# Patient Record
Sex: MALE | URBAN - METROPOLITAN AREA
[De-identification: names, ages, dates, MRNs, and addresses within clinical notes are randomized per-mention and may not be internally consistent; named-entity substitution may affect disease eponyms.]

---

## 2023-03-06 ENCOUNTER — INPATIENT (INPATIENT)
Facility: HOSPITAL | Age: 53
LOS: 7 days | Discharge: HOME | DRG: 871 | End: 2023-03-14
Attending: STUDENT IN AN ORGANIZED HEALTH CARE EDUCATION/TRAINING PROGRAM | Admitting: INTERNAL MEDICINE
Payer: SELF-PAY

## 2023-03-06 ENCOUNTER — FORM ENCOUNTER (OUTPATIENT)
Age: 53
End: 2023-03-06

## 2023-03-06 VITALS
SYSTOLIC BLOOD PRESSURE: 100 MMHG | HEART RATE: 92 BPM | OXYGEN SATURATION: 94 % | RESPIRATION RATE: 18 BRPM | DIASTOLIC BLOOD PRESSURE: 66 MMHG

## 2023-03-06 LAB
ALBUMIN SERPL ELPH-MCNC: 2.6 G/DL — LOW (ref 3.4–5)
ALBUMIN SERPL ELPH-MCNC: 2.7 G/DL — LOW (ref 3.4–5)
ALP SERPL-CCNC: 47 U/L — SIGNIFICANT CHANGE UP (ref 40–120)
ALP SERPL-CCNC: 53 U/L — SIGNIFICANT CHANGE UP (ref 40–120)
ALT FLD-CCNC: 34 U/L — SIGNIFICANT CHANGE UP (ref 12–42)
ALT FLD-CCNC: 48 U/L — HIGH (ref 12–42)
AMPHET UR-MCNC: NEGATIVE — SIGNIFICANT CHANGE UP
ANION GAP SERPL CALC-SCNC: 23 MMOL/L — HIGH (ref 9–16)
ANION GAP SERPL CALC-SCNC: 26 MMOL/L — HIGH (ref 9–16)
ANION GAP SERPL CALC-SCNC: 28 MMOL/L — HIGH (ref 9–16)
ANION GAP SERPL CALC-SCNC: 31 MMOL/L — HIGH (ref 9–16)
ANION GAP SERPL CALC-SCNC: >30 MMOL/L — HIGH (ref 9–16)
APAP SERPL-MCNC: <2 UG/ML — LOW (ref 10–30)
APPEARANCE UR: CLEAR — SIGNIFICANT CHANGE UP
APTT BLD: 26.3 SEC — LOW (ref 27.5–35.5)
AST SERPL-CCNC: 26 U/L — SIGNIFICANT CHANGE UP (ref 15–37)
AST SERPL-CCNC: 44 U/L — HIGH (ref 15–37)
BACTERIA # UR AUTO: PRESENT /HPF
BARBITURATES UR SCN-MCNC: NEGATIVE — SIGNIFICANT CHANGE UP
BASOPHILS # BLD AUTO: 0 K/UL — SIGNIFICANT CHANGE UP (ref 0–0.2)
BASOPHILS NFR BLD AUTO: 0 % — SIGNIFICANT CHANGE UP (ref 0–2)
BENZODIAZ UR-MCNC: NEGATIVE — SIGNIFICANT CHANGE UP
BILIRUB SERPL-MCNC: 0.2 MG/DL — SIGNIFICANT CHANGE UP (ref 0.2–1.2)
BILIRUB SERPL-MCNC: 0.4 MG/DL — SIGNIFICANT CHANGE UP (ref 0.2–1.2)
BILIRUB UR-MCNC: NEGATIVE — SIGNIFICANT CHANGE UP
BUN SERPL-MCNC: 37 MG/DL — HIGH (ref 7–23)
BUN SERPL-MCNC: 39 MG/DL — HIGH (ref 7–23)
BUN SERPL-MCNC: 40 MG/DL — HIGH (ref 7–23)
CALCIUM SERPL-MCNC: 7.1 MG/DL — LOW (ref 8.5–10.5)
CALCIUM SERPL-MCNC: 7.3 MG/DL — LOW (ref 8.5–10.5)
CALCIUM SERPL-MCNC: 8.5 MG/DL — SIGNIFICANT CHANGE UP (ref 8.5–10.5)
CALCIUM SERPL-MCNC: 8.5 MG/DL — SIGNIFICANT CHANGE UP (ref 8.5–10.5)
CALCIUM SERPL-MCNC: 8.6 MG/DL — SIGNIFICANT CHANGE UP (ref 8.5–10.5)
CHLORIDE SERPL-SCNC: 101 MMOL/L — SIGNIFICANT CHANGE UP (ref 96–108)
CHLORIDE SERPL-SCNC: 101 MMOL/L — SIGNIFICANT CHANGE UP (ref 96–108)
CHLORIDE SERPL-SCNC: 103 MMOL/L — SIGNIFICANT CHANGE UP (ref 96–108)
CHLORIDE SERPL-SCNC: 97 MMOL/L — SIGNIFICANT CHANGE UP (ref 96–108)
CHLORIDE SERPL-SCNC: 99 MMOL/L — SIGNIFICANT CHANGE UP (ref 96–108)
CK SERPL-CCNC: 464 U/L — HIGH (ref 39–308)
CO2 SERPL-SCNC: 11 MMOL/L — LOW (ref 22–31)
CO2 SERPL-SCNC: 15 MMOL/L — LOW (ref 22–31)
CO2 SERPL-SCNC: 6 MMOL/L — CRITICAL LOW (ref 22–31)
CO2 SERPL-SCNC: 8 MMOL/L — CRITICAL LOW (ref 22–31)
CO2 SERPL-SCNC: <6 MMOL/L — CRITICAL LOW (ref 22–31)
COCAINE METAB.OTHER UR-MCNC: NEGATIVE — SIGNIFICANT CHANGE UP
COLOR SPEC: YELLOW — SIGNIFICANT CHANGE UP
COMMENT - URINE: SIGNIFICANT CHANGE UP
CREAT SERPL-MCNC: 1.73 MG/DL — HIGH (ref 0.5–1.3)
CREAT SERPL-MCNC: 1.77 MG/DL — HIGH (ref 0.5–1.3)
CREAT SERPL-MCNC: 2 MG/DL — HIGH (ref 0.5–1.3)
CREAT SERPL-MCNC: 2.03 MG/DL — HIGH (ref 0.5–1.3)
CREAT SERPL-MCNC: 2.07 MG/DL — HIGH (ref 0.5–1.3)
D DIMER BLD IA.RAPID-MCNC: 224 NG/ML DDU — SIGNIFICANT CHANGE UP
DIFF PNL FLD: ABNORMAL
EGFR: 38 ML/MIN/1.73M2 — LOW
EGFR: 38 ML/MIN/1.73M2 — LOW
EGFR: 39 ML/MIN/1.73M2 — LOW
EGFR: 45 ML/MIN/1.73M2 — LOW
EGFR: 47 ML/MIN/1.73M2 — LOW
EOSINOPHIL # BLD AUTO: 0.06 K/UL — SIGNIFICANT CHANGE UP (ref 0–0.5)
EOSINOPHIL NFR BLD AUTO: 0.5 % — SIGNIFICANT CHANGE UP (ref 0–6)
EPI CELLS # UR: SIGNIFICANT CHANGE UP /HPF (ref 0–5)
ETHANOL SERPL-MCNC: <3 MG/DL — SIGNIFICANT CHANGE UP
FLUAV AG NPH QL: SIGNIFICANT CHANGE UP
FLUBV AG NPH QL: SIGNIFICANT CHANGE UP
GLUCOSE SERPL-MCNC: 179 MG/DL — HIGH (ref 70–99)
GLUCOSE SERPL-MCNC: 193 MG/DL — HIGH (ref 70–99)
GLUCOSE SERPL-MCNC: 207 MG/DL — HIGH (ref 70–99)
GLUCOSE SERPL-MCNC: 234 MG/DL — HIGH (ref 70–99)
GLUCOSE SERPL-MCNC: 272 MG/DL — HIGH (ref 70–99)
GLUCOSE UR QL: NEGATIVE — SIGNIFICANT CHANGE UP
HCT VFR BLD CALC: 13.3 % — CRITICAL LOW (ref 39–50)
HCT VFR BLD CALC: 9.2 % — CRITICAL LOW (ref 39–50)
HGB BLD-MCNC: 2.2 G/DL — CRITICAL LOW (ref 13–17)
HGB BLD-MCNC: 3.9 G/DL — CRITICAL LOW (ref 13–17)
HYALINE CASTS # UR AUTO: ABNORMAL /LPF (ref 0–2)
INR BLD: 1.23 — HIGH (ref 0.88–1.16)
KETONES UR-MCNC: NEGATIVE — SIGNIFICANT CHANGE UP
LACTATE SERPL-SCNC: 14.7 MMOL/L — CRITICAL HIGH (ref 0.4–2)
LACTATE SERPL-SCNC: >15 MMOL/L — CRITICAL HIGH (ref 0.4–2)
LACTATE SERPL-SCNC: >15 MMOL/L — CRITICAL HIGH (ref 0.4–2)
LEUKOCYTE ESTERASE UR-ACNC: NEGATIVE — SIGNIFICANT CHANGE UP
LYMPHOCYTES # BLD AUTO: 0.66 K/UL — LOW (ref 1–3.3)
LYMPHOCYTES # BLD AUTO: 5.4 % — LOW (ref 13–44)
MCHC RBC-ENTMCNC: 17.5 PG — LOW (ref 27–34)
MCHC RBC-ENTMCNC: 23.6 PG — LOW (ref 27–34)
MCHC RBC-ENTMCNC: 23.9 GM/DL — LOW (ref 32–36)
MCHC RBC-ENTMCNC: 29.3 GM/DL — LOW (ref 32–36)
MCV RBC AUTO: 73 FL — LOW (ref 80–100)
MCV RBC AUTO: 80.6 FL — SIGNIFICANT CHANGE UP (ref 80–100)
METHADONE UR-MCNC: NEGATIVE — SIGNIFICANT CHANGE UP
MONOCYTES # BLD AUTO: 0.58 K/UL — SIGNIFICANT CHANGE UP (ref 0–0.9)
MONOCYTES NFR BLD AUTO: 4.8 % — SIGNIFICANT CHANGE UP (ref 2–14)
NEUTROPHILS # BLD AUTO: 10.75 K/UL — HIGH (ref 1.8–7.4)
NEUTROPHILS NFR BLD AUTO: 88.2 % — HIGH (ref 43–77)
NITRITE UR-MCNC: NEGATIVE — SIGNIFICANT CHANGE UP
NT-PROBNP SERPL-SCNC: 3114 PG/ML — HIGH
OB PNL STL: NEGATIVE — SIGNIFICANT CHANGE UP
OPIATES UR-MCNC: NEGATIVE — SIGNIFICANT CHANGE UP
PCO2 BLDA: 23 MMHG — LOW (ref 35–48)
PCO2 BLDA: 25 MMHG — LOW (ref 35–48)
PCO2 BLDA: 31 MMHG — LOW (ref 35–48)
PCO2 BLDV: 26 MMHG — LOW (ref 42–55)
PCO2 BLDV: 30 MMHG — LOW (ref 42–55)
PCP SPEC-MCNC: SIGNIFICANT CHANGE UP
PCP UR-MCNC: NEGATIVE — SIGNIFICANT CHANGE UP
PH BLDA: 7.04 — CRITICAL LOW (ref 7.35–7.45)
PH BLDA: 7.1 — CRITICAL LOW (ref 7.35–7.45)
PH BLDA: 7.22 — LOW (ref 7.35–7.45)
PH BLDV: 6.94 — LOW (ref 7.32–7.43)
PH BLDV: 7.24 — LOW (ref 7.32–7.43)
PH UR: 5.5 — SIGNIFICANT CHANGE UP (ref 5–8)
PLATELET # BLD AUTO: 359 K/UL — SIGNIFICANT CHANGE UP (ref 150–400)
PLATELET # BLD AUTO: 649 K/UL — HIGH (ref 150–400)
PO2 BLDA: 319 MMHG — HIGH (ref 83–108)
PO2 BLDA: 504 MMHG — HIGH (ref 83–108)
PO2 BLDA: 89 MMHG — SIGNIFICANT CHANGE UP (ref 83–108)
PO2 BLDV: 100 MMHG — HIGH (ref 25–45)
PO2 BLDV: 141 MMHG — HIGH (ref 25–45)
POTASSIUM SERPL-MCNC: 3.5 MMOL/L — SIGNIFICANT CHANGE UP (ref 3.5–5.3)
POTASSIUM SERPL-MCNC: 3.7 MMOL/L — SIGNIFICANT CHANGE UP (ref 3.5–5.3)
POTASSIUM SERPL-MCNC: 3.7 MMOL/L — SIGNIFICANT CHANGE UP (ref 3.5–5.3)
POTASSIUM SERPL-MCNC: 3.8 MMOL/L — SIGNIFICANT CHANGE UP (ref 3.5–5.3)
POTASSIUM SERPL-MCNC: 4.5 MMOL/L — SIGNIFICANT CHANGE UP (ref 3.5–5.3)
POTASSIUM SERPL-SCNC: 3.5 MMOL/L — SIGNIFICANT CHANGE UP (ref 3.5–5.3)
POTASSIUM SERPL-SCNC: 3.7 MMOL/L — SIGNIFICANT CHANGE UP (ref 3.5–5.3)
POTASSIUM SERPL-SCNC: 3.7 MMOL/L — SIGNIFICANT CHANGE UP (ref 3.5–5.3)
POTASSIUM SERPL-SCNC: 3.8 MMOL/L — SIGNIFICANT CHANGE UP (ref 3.5–5.3)
POTASSIUM SERPL-SCNC: 4.5 MMOL/L — SIGNIFICANT CHANGE UP (ref 3.5–5.3)
PROT SERPL-MCNC: 6.9 G/DL — SIGNIFICANT CHANGE UP (ref 6.4–8.2)
PROT SERPL-MCNC: 7.1 G/DL — SIGNIFICANT CHANGE UP (ref 6.4–8.2)
PROT UR-MCNC: 30 MG/DL
PROTHROM AB SERPL-ACNC: 14.4 SEC — HIGH (ref 10.5–13.4)
RBC # BLD: 1.26 M/UL — LOW (ref 4.2–5.8)
RBC # BLD: 1.26 M/UL — LOW (ref 4.2–5.8)
RBC # BLD: 1.65 M/UL — LOW (ref 4.2–5.8)
RBC # FLD: 19.9 % — HIGH (ref 10.3–14.5)
RBC # FLD: SIGNIFICANT CHANGE UP (ref 10.3–14.5)
RBC CASTS # UR COMP ASSIST: < 5 /HPF — SIGNIFICANT CHANGE UP
RETICS/RBC NFR: 3.3 % — HIGH (ref 0.5–2.5)
RSV RNA NPH QL NAA+NON-PROBE: SIGNIFICANT CHANGE UP
SALICYLATES SERPL-MCNC: 2.5 MG/DL — LOW (ref 2.8–20)
SAO2 % BLDA: 99.5 % — HIGH (ref 94–98)
SAO2 % BLDA: SIGNIFICANT CHANGE UP % (ref 94–98)
SAO2 % BLDA: SIGNIFICANT CHANGE UP % (ref 94–98)
SAO2 % BLDV: 99.5 % — HIGH (ref 67–88)
SAO2 % BLDV: SIGNIFICANT CHANGE UP % (ref 67–88)
SARS-COV-2 RNA SPEC QL NAA+PROBE: SIGNIFICANT CHANGE UP
SODIUM SERPL-SCNC: 134 MMOL/L — SIGNIFICANT CHANGE UP (ref 132–145)
SODIUM SERPL-SCNC: 135 MMOL/L — SIGNIFICANT CHANGE UP (ref 132–145)
SODIUM SERPL-SCNC: 137 MMOL/L — SIGNIFICANT CHANGE UP (ref 132–145)
SODIUM SERPL-SCNC: 139 MMOL/L — SIGNIFICANT CHANGE UP (ref 132–145)
SODIUM SERPL-SCNC: 140 MMOL/L — SIGNIFICANT CHANGE UP (ref 132–145)
SP GR SPEC: >=1.03 — SIGNIFICANT CHANGE UP (ref 1–1.03)
THC UR QL: NEGATIVE — SIGNIFICANT CHANGE UP
TROPONIN I, HIGH SENSITIVITY RESULT: 22.8 NG/L — SIGNIFICANT CHANGE UP
UROBILINOGEN FLD QL: 0.2 E.U./DL — SIGNIFICANT CHANGE UP
WBC # BLD: 12.18 K/UL — HIGH (ref 3.8–10.5)
WBC # BLD: 9.73 K/UL — SIGNIFICANT CHANGE UP (ref 3.8–10.5)
WBC # FLD AUTO: 12.18 K/UL — HIGH (ref 3.8–10.5)
WBC # FLD AUTO: 9.73 K/UL — SIGNIFICANT CHANGE UP (ref 3.8–10.5)
WBC UR QL: ABNORMAL /HPF

## 2023-03-06 PROCEDURE — 70450 CT HEAD/BRAIN W/O DYE: CPT | Mod: 26

## 2023-03-06 PROCEDURE — 71045 X-RAY EXAM CHEST 1 VIEW: CPT | Mod: 26

## 2023-03-06 PROCEDURE — 99292 CRITICAL CARE ADDL 30 MIN: CPT | Mod: 25

## 2023-03-06 PROCEDURE — 31500 INSERT EMERGENCY AIRWAY: CPT

## 2023-03-06 PROCEDURE — 99291 CRITICAL CARE FIRST HOUR: CPT | Mod: 25

## 2023-03-06 PROCEDURE — 99291 CRITICAL CARE FIRST HOUR: CPT | Mod: GC

## 2023-03-06 PROCEDURE — 36556 INSERT NON-TUNNEL CV CATH: CPT

## 2023-03-06 RX ORDER — SODIUM CHLORIDE 9 MG/ML
1000 INJECTION INTRAMUSCULAR; INTRAVENOUS; SUBCUTANEOUS ONCE
Refills: 0 | Status: COMPLETED | OUTPATIENT
Start: 2023-03-06 | End: 2023-03-06

## 2023-03-06 RX ORDER — PIPERACILLIN AND TAZOBACTAM 4; .5 G/20ML; G/20ML
3.38 INJECTION, POWDER, LYOPHILIZED, FOR SOLUTION INTRAVENOUS ONCE
Refills: 0 | Status: COMPLETED | OUTPATIENT
Start: 2023-03-06 | End: 2023-03-06

## 2023-03-06 RX ORDER — SODIUM BICARBONATE 1 MEQ/ML
50 SYRINGE (ML) INTRAVENOUS ONCE
Refills: 0 | Status: COMPLETED | OUTPATIENT
Start: 2023-03-06 | End: 2023-03-06

## 2023-03-06 RX ORDER — NOREPINEPHRINE BITARTRATE/D5W 8 MG/250ML
0.05 PLASTIC BAG, INJECTION (ML) INTRAVENOUS
Qty: 16 | Refills: 0 | Status: DISCONTINUED | OUTPATIENT
Start: 2023-03-06 | End: 2023-03-07

## 2023-03-06 RX ORDER — SODIUM BICARBONATE 1 MEQ/ML
0.3 SYRINGE (ML) INTRAVENOUS
Qty: 150 | Refills: 0 | Status: DISCONTINUED | OUTPATIENT
Start: 2023-03-06 | End: 2023-03-07

## 2023-03-06 RX ORDER — FENTANYL CITRATE 50 UG/ML
0.5 INJECTION INTRAVENOUS
Qty: 2500 | Refills: 0 | Status: DISCONTINUED | OUTPATIENT
Start: 2023-03-06 | End: 2023-03-07

## 2023-03-06 RX ORDER — PROPOFOL 10 MG/ML
5 INJECTION, EMULSION INTRAVENOUS
Qty: 1000 | Refills: 0 | Status: DISCONTINUED | OUTPATIENT
Start: 2023-03-06 | End: 2023-03-06

## 2023-03-06 RX ORDER — LEVETIRACETAM 250 MG/1
1000 TABLET, FILM COATED ORAL ONCE
Refills: 0 | Status: COMPLETED | OUTPATIENT
Start: 2023-03-06 | End: 2023-03-06

## 2023-03-06 RX ORDER — SODIUM BICARBONATE 1 MEQ/ML
2.14 SYRINGE (ML) INTRAVENOUS
Qty: 150 | Refills: 0 | Status: DISCONTINUED | OUTPATIENT
Start: 2023-03-06 | End: 2023-03-06

## 2023-03-06 RX ORDER — ETOMIDATE 2 MG/ML
20 INJECTION INTRAVENOUS ONCE
Refills: 0 | Status: COMPLETED | OUTPATIENT
Start: 2023-03-06 | End: 2023-03-06

## 2023-03-06 RX ORDER — ROCURONIUM BROMIDE 10 MG/ML
100 VIAL (ML) INTRAVENOUS ONCE
Refills: 0 | Status: COMPLETED | OUTPATIENT
Start: 2023-03-06 | End: 2023-03-06

## 2023-03-06 RX ORDER — SODIUM BICARBONATE 1 MEQ/ML
2.1 SYRINGE (ML) INTRAVENOUS
Qty: 150 | Refills: 0 | Status: DISCONTINUED | OUTPATIENT
Start: 2023-03-06 | End: 2023-03-06

## 2023-03-06 RX ORDER — VANCOMYCIN HCL 1 G
1000 VIAL (EA) INTRAVENOUS ONCE
Refills: 0 | Status: COMPLETED | OUTPATIENT
Start: 2023-03-06 | End: 2023-03-06

## 2023-03-06 RX ADMIN — PIPERACILLIN AND TAZOBACTAM 200 GRAM(S): 4; .5 INJECTION, POWDER, LYOPHILIZED, FOR SOLUTION INTRAVENOUS at 20:00

## 2023-03-06 RX ADMIN — SODIUM CHLORIDE 1000 MILLILITER(S): 9 INJECTION INTRAMUSCULAR; INTRAVENOUS; SUBCUTANEOUS at 20:29

## 2023-03-06 RX ADMIN — FENTANYL CITRATE 3.5 MICROGRAM(S)/KG/HR: 50 INJECTION INTRAVENOUS at 23:45

## 2023-03-06 RX ADMIN — Medication 50 MILLIEQUIVALENT(S): at 20:15

## 2023-03-06 RX ADMIN — Medication 140 MEQ/KG/HR: at 23:48

## 2023-03-06 RX ADMIN — FENTANYL CITRATE 3.5 MICROGRAM(S)/KG/HR: 50 INJECTION INTRAVENOUS at 22:22

## 2023-03-06 RX ADMIN — LEVETIRACETAM 440 MILLIGRAM(S): 250 TABLET, FILM COATED ORAL at 18:05

## 2023-03-06 RX ADMIN — Medication 250 MILLIGRAM(S): at 20:29

## 2023-03-06 RX ADMIN — Medication 50 MILLIEQUIVALENT(S): at 20:38

## 2023-03-06 RX ADMIN — Medication 50 MILLIEQUIVALENT(S): at 20:37

## 2023-03-06 RX ADMIN — Medication 100 MILLIGRAM(S): at 18:30

## 2023-03-06 RX ADMIN — PROPOFOL 2.1 MICROGRAM(S)/KG/MIN: 10 INJECTION, EMULSION INTRAVENOUS at 19:49

## 2023-03-06 RX ADMIN — Medication 50 MILLIEQUIVALENT(S): at 22:30

## 2023-03-06 RX ADMIN — ETOMIDATE 20 MILLIGRAM(S): 2 INJECTION INTRAVENOUS at 18:30

## 2023-03-06 RX ADMIN — Medication 50 MILLIEQUIVALENT(S): at 22:29

## 2023-03-06 RX ADMIN — SODIUM CHLORIDE 1000 MILLILITER(S): 9 INJECTION INTRAMUSCULAR; INTRAVENOUS; SUBCUTANEOUS at 19:30

## 2023-03-06 RX ADMIN — SODIUM CHLORIDE 1000 MILLILITER(S): 9 INJECTION INTRAMUSCULAR; INTRAVENOUS; SUBCUTANEOUS at 22:43

## 2023-03-06 NOTE — ED PROVIDER NOTE - CARE PLAN
1 Principal Discharge DX:	Seizure  Secondary Diagnosis:	Respiratory failure, unspecified with hypoxia

## 2023-03-06 NOTE — ED PROCEDURE NOTE - CPROC ED INDICATIONS1
airway protection/critical patient/mental status change
emergency venous access/hemodynamic monitoring/volume resuscitation

## 2023-03-06 NOTE — ED PROVIDER NOTE - CLINICAL SUMMARY MEDICAL DECISION MAKING FREE TEXT BOX
pt presents with ams, unusual behavior, ataxic gait, tachypneic, uncooperative. pt also completely covered in bed bugs. difficult to obtain history and physical. required intubation for airway management and abnormal chemistry. repeat labs sent. [facility] pending to eval for new onset seizure. pt presents with ams, unusual behavior, ataxic gait, tachypneic, uncooperative. pt also completely covered in bed bugs. difficult to obtain history and physical. required intubation for airway management, acidemic and abnormal chemistry. repeat labs sent. [facility] pending to eval for new onset seizure.      CTH on ED read is negative. pH is <7 with Co2 <6 and anion cap >30. unable to obtain CBC as lab reports blood is too thin. given abx, cultures obtained, tox pending. d/w Dr. Mendoza for MICU admission. bicarb drip started but he requests pushing an amp and rechecking gas after 20 minutes and repeating until pH 7.1 or higher to ensure stable transfer.

## 2023-03-06 NOTE — ED PROVIDER NOTE - NEUROLOGICAL, MLM
Alert, difficult to interview but answers most questions appropriately, difficult to understand, moving extremities normally, ataxic gait

## 2023-03-06 NOTE — ED ADULT NURSE NOTE - OBJECTIVE STATEMENT
Pt BIBA for bed bugs. During assessment pt has x2 seizures and became tachypneic and unresponsive, intubated for airway protection. See 1950 reassessment note for more details.

## 2023-03-06 NOTE — ED PROVIDER NOTE - OBJECTIVE STATEMENT
52yo M presents by EMS after being found on the ground. pt admits to being homeless and living on the streets. he reports feeling "scared" and is observed with ataxic gait, drinking water from the sink while wearing winter gloves, and then lowered himself to the ground, unable to get up. pt covered in bed bugs.

## 2023-03-06 NOTE — ED PROVIDER NOTE - PROGRESS NOTE DETAILS
Received signout at 8 PM patient currently intubated.  On assessment patient on started on bicarb drip.  Had discussion with ICU attending would prefer to repeat blood gas and get to a pH of 7.25.  Patient has no temp and currently warming as temp is unreadable x2.  Also send off guaiac and additional labs.  We will start Rian hugger, rapid rewarming and heating blanket.  EMS is on standby for tier 1 transport but per ICU recommendations due to more amps of bicarb push drip started and repeat chemistry and blood gas prior to transport. jose: central line placed, rewarming now, prbcs started, propfol switched to fentanyl drip, and started on levophed. will call intensivist jose: spoke to dr napier, and patient had tele icu consultation. per tele icu dr montoya start rr on vent at 30, and give 5U of prbc. indicated only 4 units at [facility], dr napier agrees to give more blood. repeat 2 pushes of BICARB until ph 7.25 then transfer. ph 7.22, called icu, blood bank will not release additional blood as they have limited amount. called transfer center, lights and sirens to [facility]. accepted by dr napier. 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 no overt bleeding, unable to release additional PRBCs from labs. Pt is stable for transport and can receive additional blood product after inpatient evaluation

## 2023-03-06 NOTE — ED PROVIDER NOTE - CRITICAL CARE ATTENDING CONTRIBUTION TO CARE
The patient was seen immediately upon arrival due to a high probability of imminent or life-threatening deterioration secondary to seizure, which required my direct attention, intervention, and personal management at the bedside. I have personally provided critical care time exclusive of time spent on separately billable procedures. Time includes review of laboratory data, radiology results, discussion with consultants, discussion with the patient's family, and monitoring for potential decompensation.      Patient with witnessed seizure by PA and nursing staff in the ED. Difficulty obtaining pulse ox on patient. Decision made by me to intubate patient due to poor predicted clinical course. ( Please see procedure note for details). Patient found to have bicarbonate less than 6 on CMP, Bicarbonate infusion initiated. CBC "unable to be run" per lab secondary to be "too thin" CBC reordered. Antibiotics and sepsis labs ordered. Patient handed off to Dr. Gan and overnight time and ICU contacted by ANDRES Cabrera.

## 2023-03-06 NOTE — PROVIDER CONTACT NOTE (EICU) - RECOMMENDATIONS
transfuse 4 units over 30 minutes each  increase ventilation and continue bicarbonate drip to improve PH    Transfer to [facility] as soon as possible.  Vent support  rewarming 27.5

## 2023-03-06 NOTE — ED ADULT NURSE REASSESSMENT NOTE - NS ED NURSE REASSESS COMMENT FT1
ANDRES Beltran assessing pt when he stood up to drink water from sink, reported that he had chest pain and couldn't breathe and then lowered himself to the ground, attempted to hide stating "don't harm me please". Visible bed bugs and body lice noted on patient. All clothing removed and pt placed in stretched. While attempting to get VS on pt, pt became unresponsive with eye deviated to left side. Pt placed on NRB, unable to obtain O2 sat due to poor peripheral access. Approx 20 seconds later pt appeared to be post ictal, combative, ripping off NRB and attempting to get out of bed. Pt given 1G Keppra as per MD orders. Approx 10 min later pt appeared to have another seizure with unresponsiveness and eye deviation. Pt postictal again with labored breathing. Changed x3 monitors and attempted pulse ox on multiple locations without any success. Pt altered and not responding to questions. MD Woodward decides to intubate pt for airway protection. RN and ANDRES Beltran assessing pt, when he stood up to drink water from sink, reported that he had chest pain and couldn't breathe and then lowered himself to the ground, attempted to hide stating "don't harm me please". Visible bed bugs and body lice noted on patient. All clothing removed and pt placed in stretcher. While attempting to get VS on pt, pt became unresponsive with eye deviation to the left side. Pt placed on NRB, unable to obtain O2 sat due to poor peripheral perfusion. Approx 20 seconds later pt appeared to be post ictal, combative, ripping off NRB and attempting to get out of bed. Pt given 1G Keppra as per MD orders. Approx 10 min later pt appeared to have another seizure with unresponsiveness and eye deviation. Pt postictal again with labored breathing. Changed x3 monitors and attempted pulse ox on multiple locations without any success. Pt altered, not responding to questions and tachypneic. MD Woodward decides to intubate pt for airway protection. Pt intubated with success confirmed with CO2 device, even and clear breathe sounds and chest xray. Pt decon in room and taken to CT. Hand over to OBED Rodas at 1950. RN and ANDRES Beltran assessing pt, when he stood up to drink water from sink, reported that he had chest pain and couldn't breathe and then lowered himself to the ground, attempted to hide stating "don't harm me please". Visible bed bugs and body lice noted on patient. All clothing removed and pt placed in stretcher. While attempting to get VS on pt, pt became unresponsive with eye deviation to the left side. Pt placed on NRB, unable to obtain O2 sat due to poor peripheral perfusion. Approx 20 seconds later pt appeared to be post ictal, combative, ripping off NRB and attempting to get out of bed. Pt given 1G Keppra as per PA orders. Approx 10 min later pt appeared to have another seizure with unresponsiveness and eye deviation. Pt postictal again with labored breathing. Changed x3 monitors and attempted pulse ox on multiple locations without any success.  Pt altered, not responding to questions and tachypneic. MD Woodward decides to intubate pt for airway protection. Pt intubated with success confirmed with CO2 device, even and clear breathe sounds and chest xray. Pt decon in room and then taken to CT. Hand over to OBED Rodas at 1950. RN and ANDRES Beltran assessing pt, when he stood up to drink water from sink, reported that he had chest pain and couldn't breathe and then lowered himself to the ground, attempted to hide stating "don't harm me please". Visible bed bugs and body lice noted on patient. All clothing removed and pt placed in stretcher. While attempting to get VS on pt, pt became unresponsive with eye deviation to the left side. Pt placed on NRB, unable to obtain O2 sat due to poor peripheral perfusion. Approx 20 seconds later pt appeared to be post ictal, combative, ripping off NRB and attempting to get out of bed. Pt given 1G Keppra as per PA orders. Approx 10 min later pt appeared to have another seizure with unresponsiveness and eye deviation. Pt postictal again with labored breathing. Changed x3 monitors and attempted pulse ox on multiple locations without any success.  Pt altered, not responding to questions and tachypneic. MD Woodward decides to intubate pt for airway protection. Pt intubated with success confirmed with CO2 device, even and clear breathe sounds and chest xray. Pt intubated with 7.5 ETT tube, 25@ the lip. Pt decon in room and then taken to CT. Hand over to OBED Rodas at 1950.

## 2023-03-06 NOTE — ED PROVIDER NOTE - SKIN, MLM
normal color for race, bandages crusted to bilat hands without obvious wounds or infection, bed bugs all over patient, no obvious decubitus ulcers

## 2023-03-06 NOTE — ED ADULT NURSE REASSESSMENT NOTE - NS ED NURSE REASSESS COMMENT FT1
Assumed care of pt from OBED OLIVERA. Pt intubated at present. Assumed care of pt from OBED OLIVERA. Pt intubated at present. Currently on fentanyl drip, blood infusing and bicarb drip.

## 2023-03-06 NOTE — PROVIDER CONTACT NOTE (EICU) - SITUATION
52yo M presents by EMS after being found on the ground. pt admits to being homeless and living on the streets. he reports feeling "scared" and is observed with ataxic gait, drinking water from the sink while wearing winter gloves, and then lowered himself to the ground, unable to get up. pt covered in bed bugs.  s/p seizures, intubated.  Case discussed with the ER MD.

## 2023-03-07 LAB
A1C WITH ESTIMATED AVERAGE GLUCOSE RESULT: 5.5 % — SIGNIFICANT CHANGE UP (ref 4–5.6)
ALBUMIN SERPL ELPH-MCNC: 2.7 G/DL — LOW (ref 3.3–5)
ALBUMIN SERPL ELPH-MCNC: 2.8 G/DL — LOW (ref 3.3–5)
ALP SERPL-CCNC: 46 U/L — SIGNIFICANT CHANGE UP (ref 40–120)
ALP SERPL-CCNC: 48 U/L — SIGNIFICANT CHANGE UP (ref 40–120)
ALT FLD-CCNC: 43 U/L — SIGNIFICANT CHANGE UP (ref 10–45)
ALT FLD-CCNC: 46 U/L — HIGH (ref 10–45)
ANION GAP SERPL CALC-SCNC: 10 MMOL/L — SIGNIFICANT CHANGE UP (ref 5–17)
ANION GAP SERPL CALC-SCNC: 17 MMOL/L — SIGNIFICANT CHANGE UP (ref 5–17)
ANISOCYTOSIS BLD QL: SIGNIFICANT CHANGE UP
ANISOCYTOSIS BLD QL: SIGNIFICANT CHANGE UP
ANISOCYTOSIS BLD QL: SLIGHT — SIGNIFICANT CHANGE UP
APTT BLD: 23 SEC — LOW (ref 27.5–35.5)
APTT BLD: 23.1 SEC — LOW (ref 27.5–35.5)
AST SERPL-CCNC: 57 U/L — HIGH (ref 10–40)
AST SERPL-CCNC: 57 U/L — HIGH (ref 10–40)
BASE EXCESS BLDA CALC-SCNC: -5.5 MMOL/L — LOW (ref -2–3)
BASOPHILS # BLD AUTO: 0 K/UL — SIGNIFICANT CHANGE UP (ref 0–0.2)
BASOPHILS # BLD AUTO: 0 K/UL — SIGNIFICANT CHANGE UP (ref 0–0.2)
BASOPHILS # BLD AUTO: 0.01 K/UL — SIGNIFICANT CHANGE UP (ref 0–0.2)
BASOPHILS NFR BLD AUTO: 0 % — SIGNIFICANT CHANGE UP (ref 0–2)
BASOPHILS NFR BLD AUTO: 0 % — SIGNIFICANT CHANGE UP (ref 0–2)
BASOPHILS NFR BLD AUTO: 0.1 % — SIGNIFICANT CHANGE UP (ref 0–2)
BILIRUB SERPL-MCNC: 0.4 MG/DL — SIGNIFICANT CHANGE UP (ref 0.2–1.2)
BILIRUB SERPL-MCNC: 0.6 MG/DL — SIGNIFICANT CHANGE UP (ref 0.2–1.2)
BLD GP AB SCN SERPL QL: NEGATIVE — SIGNIFICANT CHANGE UP
BUN SERPL-MCNC: 32 MG/DL — HIGH (ref 7–23)
BUN SERPL-MCNC: 36 MG/DL — HIGH (ref 7–23)
BURR CELLS BLD QL SMEAR: PRESENT — SIGNIFICANT CHANGE UP
BURR CELLS BLD QL SMEAR: PRESENT — SIGNIFICANT CHANGE UP
CALCIUM SERPL-MCNC: 6.7 MG/DL — LOW (ref 8.4–10.5)
CALCIUM SERPL-MCNC: 7.1 MG/DL — LOW (ref 8.4–10.5)
CHLORIDE SERPL-SCNC: 103 MMOL/L — SIGNIFICANT CHANGE UP (ref 96–108)
CHLORIDE SERPL-SCNC: 104 MMOL/L — SIGNIFICANT CHANGE UP (ref 96–108)
CK MB CFR SERPL CALC: 11.9 NG/ML — HIGH (ref 0–6.7)
CK MB CFR SERPL CALC: 12.7 NG/ML — HIGH (ref 0–6.7)
CK MB CFR SERPL CALC: 9.5 NG/ML — HIGH (ref 0–6.7)
CK SERPL-CCNC: 242 U/L — HIGH (ref 30–200)
CK SERPL-CCNC: 318 U/L — HIGH (ref 30–200)
CK SERPL-CCNC: 381 U/L — HIGH (ref 30–200)
CK SERPL-CCNC: 383 U/L — HIGH (ref 30–200)
CO2 BLDA-SCNC: 22 MMOL/L — SIGNIFICANT CHANGE UP (ref 19–24)
CO2 SERPL-SCNC: 22 MMOL/L — SIGNIFICANT CHANGE UP (ref 22–31)
CO2 SERPL-SCNC: 26 MMOL/L — SIGNIFICANT CHANGE UP (ref 22–31)
CREAT ?TM UR-MCNC: 57 MG/DL — SIGNIFICANT CHANGE UP
CREAT SERPL-MCNC: 1.21 MG/DL — SIGNIFICANT CHANGE UP (ref 0.5–1.3)
CREAT SERPL-MCNC: 1.24 MG/DL — SIGNIFICANT CHANGE UP (ref 0.5–1.3)
DACRYOCYTES BLD QL SMEAR: SLIGHT — SIGNIFICANT CHANGE UP
DACRYOCYTES BLD QL SMEAR: SLIGHT — SIGNIFICANT CHANGE UP
EGFR: 70 ML/MIN/1.73M2 — SIGNIFICANT CHANGE UP
EGFR: 72 ML/MIN/1.73M2 — SIGNIFICANT CHANGE UP
ELLIPTOCYTES BLD QL SMEAR: SLIGHT — SIGNIFICANT CHANGE UP
ELLIPTOCYTES BLD QL SMEAR: SLIGHT — SIGNIFICANT CHANGE UP
EOSINOPHIL # BLD AUTO: 0 K/UL — SIGNIFICANT CHANGE UP (ref 0–0.5)
EOSINOPHIL # BLD AUTO: 0 K/UL — SIGNIFICANT CHANGE UP (ref 0–0.5)
EOSINOPHIL # BLD AUTO: 0.01 K/UL — SIGNIFICANT CHANGE UP (ref 0–0.5)
EOSINOPHIL NFR BLD AUTO: 0 % — SIGNIFICANT CHANGE UP (ref 0–6)
EOSINOPHIL NFR BLD AUTO: 0 % — SIGNIFICANT CHANGE UP (ref 0–6)
EOSINOPHIL NFR BLD AUTO: 0.1 % — SIGNIFICANT CHANGE UP (ref 0–6)
ESTIMATED AVERAGE GLUCOSE: 111 MG/DL — SIGNIFICANT CHANGE UP (ref 68–114)
FIBRINOGEN PPP-MCNC: 233 MG/DL — LOW (ref 258–438)
GAS PNL BLDA: SIGNIFICANT CHANGE UP
GIANT PLATELETS BLD QL SMEAR: PRESENT — SIGNIFICANT CHANGE UP
GLUCOSE BLDC GLUCOMTR-MCNC: 148 MG/DL — HIGH (ref 70–99)
GLUCOSE BLDC GLUCOMTR-MCNC: 188 MG/DL — HIGH (ref 70–99)
GLUCOSE BLDC GLUCOMTR-MCNC: 73 MG/DL — SIGNIFICANT CHANGE UP (ref 70–99)
GLUCOSE BLDC GLUCOMTR-MCNC: 76 MG/DL — SIGNIFICANT CHANGE UP (ref 70–99)
GLUCOSE BLDC GLUCOMTR-MCNC: 95 MG/DL — SIGNIFICANT CHANGE UP (ref 70–99)
GLUCOSE BLDC GLUCOMTR-MCNC: 96 MG/DL — SIGNIFICANT CHANGE UP (ref 70–99)
GLUCOSE SERPL-MCNC: 140 MG/DL — HIGH (ref 70–99)
GLUCOSE SERPL-MCNC: 201 MG/DL — HIGH (ref 70–99)
HAPTOGLOB SERPL-MCNC: 144 MG/DL — SIGNIFICANT CHANGE UP (ref 34–200)
HAV IGM SER-ACNC: SIGNIFICANT CHANGE UP
HBV CORE AB SER-ACNC: SIGNIFICANT CHANGE UP
HBV CORE IGM SER-ACNC: SIGNIFICANT CHANGE UP
HBV SURFACE AB SER-ACNC: SIGNIFICANT CHANGE UP
HBV SURFACE AG SER-ACNC: SIGNIFICANT CHANGE UP
HCO3 BLDA-SCNC: 21 MMOL/L — SIGNIFICANT CHANGE UP (ref 21–28)
HCT VFR BLD CALC: 12.5 % — CRITICAL LOW (ref 39–50)
HCT VFR BLD CALC: 19.9 % — CRITICAL LOW (ref 39–50)
HCT VFR BLD CALC: 23.1 % — LOW (ref 39–50)
HCT VFR BLD CALC: 23.5 % — LOW (ref 39–50)
HCV AB S/CO SERPL IA: 0.04 S/CO — SIGNIFICANT CHANGE UP
HCV AB SERPL-IMP: SIGNIFICANT CHANGE UP
HGB A MFR BLD: 98.6 % — HIGH (ref 95.8–98)
HGB A2 MFR BLD: 1.4 % — LOW (ref 2–3.2)
HGB BLD-MCNC: 3.9 G/DL — CRITICAL LOW (ref 13–17)
HGB BLD-MCNC: 6.5 G/DL — CRITICAL LOW (ref 13–17)
HGB BLD-MCNC: 7.7 G/DL — LOW (ref 13–17)
HGB BLD-MCNC: 7.8 G/DL — LOW (ref 13–17)
HGB C MFR BLD: 0 % — SIGNIFICANT CHANGE UP
HGB F MFR BLD: 0 % — SIGNIFICANT CHANGE UP (ref 0–1)
HGB OTHER MFR BLD ELPH: 0 % — SIGNIFICANT CHANGE UP
HGB S MFR BLD: 0 % — SIGNIFICANT CHANGE UP
HIV 1+2 AB+HIV1 P24 AG SERPL QL IA: SIGNIFICANT CHANGE UP
HYPOCHROMIA BLD QL: SIGNIFICANT CHANGE UP
HYPOCHROMIA BLD QL: SIGNIFICANT CHANGE UP
IMM GRANULOCYTES NFR BLD AUTO: 0.5 % — SIGNIFICANT CHANGE UP (ref 0–0.9)
IMM GRANULOCYTES NFR BLD AUTO: 0.8 % — SIGNIFICANT CHANGE UP (ref 0–0.9)
INR BLD: 1.17 — HIGH (ref 0.88–1.16)
LACTATE SERPL-SCNC: 3 MMOL/L — HIGH (ref 0.5–2)
LACTATE SERPL-SCNC: 8 MMOL/L — CRITICAL HIGH (ref 0.5–2)
LDH SERPL L TO P-CCNC: 256 U/L — HIGH (ref 50–242)
LDH SERPL L TO P-CCNC: 272 U/L — HIGH (ref 50–242)
LYMPHOCYTES # BLD AUTO: 0.22 K/UL — LOW (ref 1–3.3)
LYMPHOCYTES # BLD AUTO: 0.38 K/UL — LOW (ref 1–3.3)
LYMPHOCYTES # BLD AUTO: 0.44 K/UL — LOW (ref 1–3.3)
LYMPHOCYTES # BLD AUTO: 2.3 % — LOW (ref 13–44)
LYMPHOCYTES # BLD AUTO: 4.6 % — LOW (ref 13–44)
LYMPHOCYTES # BLD AUTO: 5.3 % — LOW (ref 13–44)
MACROCYTES BLD QL: SLIGHT — SIGNIFICANT CHANGE UP
MAGNESIUM SERPL-MCNC: 2 MG/DL — SIGNIFICANT CHANGE UP (ref 1.6–2.6)
MAGNESIUM SERPL-MCNC: 2.1 MG/DL — SIGNIFICANT CHANGE UP (ref 1.6–2.6)
MANUAL SMEAR VERIFICATION: SIGNIFICANT CHANGE UP
MANUAL SMEAR VERIFICATION: SIGNIFICANT CHANGE UP
MCHC RBC-ENTMCNC: 23.5 PG — LOW (ref 27–34)
MCHC RBC-ENTMCNC: 25.4 PG — LOW (ref 27–34)
MCHC RBC-ENTMCNC: 25.8 PG — LOW (ref 27–34)
MCHC RBC-ENTMCNC: 26.4 PG — LOW (ref 27–34)
MCHC RBC-ENTMCNC: 31.2 GM/DL — LOW (ref 32–36)
MCHC RBC-ENTMCNC: 32.7 GM/DL — SIGNIFICANT CHANGE UP (ref 32–36)
MCHC RBC-ENTMCNC: 32.8 GM/DL — SIGNIFICANT CHANGE UP (ref 32–36)
MCHC RBC-ENTMCNC: 33.8 GM/DL — SIGNIFICANT CHANGE UP (ref 32–36)
MCV RBC AUTO: 75.3 FL — LOW (ref 80–100)
MCV RBC AUTO: 77.7 FL — LOW (ref 80–100)
MCV RBC AUTO: 78 FL — LOW (ref 80–100)
MCV RBC AUTO: 78.6 FL — LOW (ref 80–100)
MICROCYTES BLD QL: SIGNIFICANT CHANGE UP
MICROCYTES BLD QL: SLIGHT — SIGNIFICANT CHANGE UP
MONOCYTES # BLD AUTO: 0.22 K/UL — SIGNIFICANT CHANGE UP (ref 0–0.9)
MONOCYTES # BLD AUTO: 0.26 K/UL — SIGNIFICANT CHANGE UP (ref 0–0.9)
MONOCYTES # BLD AUTO: 0.3 K/UL — SIGNIFICANT CHANGE UP (ref 0–0.9)
MONOCYTES NFR BLD AUTO: 2.7 % — SIGNIFICANT CHANGE UP (ref 2–14)
MONOCYTES NFR BLD AUTO: 2.7 % — SIGNIFICANT CHANGE UP (ref 2–14)
MONOCYTES NFR BLD AUTO: 3.6 % — SIGNIFICANT CHANGE UP (ref 2–14)
MRSA PCR RESULT.: NEGATIVE — SIGNIFICANT CHANGE UP
NEUTROPHILS # BLD AUTO: 7.59 K/UL — HIGH (ref 1.8–7.4)
NEUTROPHILS # BLD AUTO: 7.65 K/UL — HIGH (ref 1.8–7.4)
NEUTROPHILS # BLD AUTO: 9.15 K/UL — HIGH (ref 1.8–7.4)
NEUTROPHILS NFR BLD AUTO: 91.3 % — HIGH (ref 43–77)
NEUTROPHILS NFR BLD AUTO: 92 % — HIGH (ref 43–77)
NEUTROPHILS NFR BLD AUTO: 94 % — HIGH (ref 43–77)
NRBC # BLD: 11 /100 — HIGH (ref 0–0)
NRBC # BLD: 2 /100 WBCS — HIGH (ref 0–0)
NRBC # BLD: 2 /100 — HIGH (ref 0–0)
NRBC # BLD: 4 /100 WBCS — HIGH (ref 0–0)
NRBC # BLD: 5 /100 WBCS — HIGH (ref 0–0)
NRBC # BLD: 5 /100 WBCS — HIGH (ref 0–0)
NRBC # BLD: SIGNIFICANT CHANGE UP /100 WBCS (ref 0–0)
OVALOCYTES BLD QL SMEAR: SLIGHT — SIGNIFICANT CHANGE UP
OVALOCYTES BLD QL SMEAR: SLIGHT — SIGNIFICANT CHANGE UP
PCO2 BLDA: 42 MMHG — SIGNIFICANT CHANGE UP (ref 35–48)
PH BLDA: 7.3 — LOW (ref 7.35–7.45)
PHOSPHATE SERPL-MCNC: 4.2 MG/DL — SIGNIFICANT CHANGE UP (ref 2.5–4.5)
PHOSPHATE SERPL-MCNC: 4.5 MG/DL — SIGNIFICANT CHANGE UP (ref 2.5–4.5)
PLAT MORPH BLD: ABNORMAL
PLAT MORPH BLD: NORMAL — SIGNIFICANT CHANGE UP
PLAT MORPH BLD: NORMAL — SIGNIFICANT CHANGE UP
PLATELET # BLD AUTO: 275 K/UL — SIGNIFICANT CHANGE UP (ref 150–400)
PLATELET # BLD AUTO: 304 K/UL — SIGNIFICANT CHANGE UP (ref 150–400)
PLATELET # BLD AUTO: 376 K/UL — SIGNIFICANT CHANGE UP (ref 150–400)
PLATELET # BLD AUTO: 389 K/UL — SIGNIFICANT CHANGE UP (ref 150–400)
PLATELET COUNT - ESTIMATE: NORMAL — SIGNIFICANT CHANGE UP
PO2 BLDA: 421 MMHG — HIGH (ref 83–108)
POIKILOCYTOSIS BLD QL AUTO: SIGNIFICANT CHANGE UP
POIKILOCYTOSIS BLD QL AUTO: SLIGHT — SIGNIFICANT CHANGE UP
POIKILOCYTOSIS BLD QL AUTO: SLIGHT — SIGNIFICANT CHANGE UP
POLYCHROMASIA BLD QL SMEAR: SLIGHT — SIGNIFICANT CHANGE UP
POLYCHROMASIA BLD QL SMEAR: SLIGHT — SIGNIFICANT CHANGE UP
POTASSIUM SERPL-MCNC: 3 MMOL/L — LOW (ref 3.5–5.3)
POTASSIUM SERPL-MCNC: 3.6 MMOL/L — SIGNIFICANT CHANGE UP (ref 3.5–5.3)
POTASSIUM SERPL-SCNC: 3 MMOL/L — LOW (ref 3.5–5.3)
POTASSIUM SERPL-SCNC: 3.6 MMOL/L — SIGNIFICANT CHANGE UP (ref 3.5–5.3)
PROT SERPL-MCNC: 5.2 G/DL — LOW (ref 6–8.3)
PROT SERPL-MCNC: 5.4 G/DL — LOW (ref 6–8.3)
PROTHROM AB SERPL-ACNC: 13.9 SEC — HIGH (ref 10.5–13.4)
RBC # BLD: 1.66 M/UL — LOW (ref 4.2–5.8)
RBC # BLD: 2.56 M/UL — LOW (ref 4.2–5.8)
RBC # BLD: 2.96 M/UL — LOW (ref 4.2–5.8)
RBC # BLD: 2.99 M/UL — LOW (ref 4.2–5.8)
RBC # FLD: 21.3 % — HIGH (ref 10.3–14.5)
RBC # FLD: 21.6 % — HIGH (ref 10.3–14.5)
RBC # FLD: 22.5 % — HIGH (ref 10.3–14.5)
RBC # FLD: 24.2 % — HIGH (ref 10.3–14.5)
RBC BLD AUTO: ABNORMAL
RH IG SCN BLD-IMP: POSITIVE — SIGNIFICANT CHANGE UP
ROULEAUX BLD QL SMEAR: PRESENT
S AUREUS DNA NOSE QL NAA+PROBE: POSITIVE
SAO2 % BLDA: 100 % — HIGH (ref 94–98)
SCHISTOCYTES BLD QL AUTO: SIGNIFICANT CHANGE UP
SCHISTOCYTES BLD QL AUTO: SLIGHT — SIGNIFICANT CHANGE UP
SICKLE CELL DISEASE SCREENING TEST: POSITIVE
SICKLE CELLS BLD QL SMEAR: SLIGHT — SIGNIFICANT CHANGE UP
SODIUM SERPL-SCNC: 140 MMOL/L — SIGNIFICANT CHANGE UP (ref 135–145)
SODIUM SERPL-SCNC: 142 MMOL/L — SIGNIFICANT CHANGE UP (ref 135–145)
SODIUM UR-SCNC: 43 MMOL/L — SIGNIFICANT CHANGE UP
TROPONIN T SERPL-MCNC: 0.02 NG/ML — HIGH (ref 0–0.01)
TROPONIN T SERPL-MCNC: 0.02 NG/ML — HIGH (ref 0–0.01)
TROPONIN T SERPL-MCNC: 0.03 NG/ML — HIGH (ref 0–0.01)
TROPONIN T SERPL-MCNC: 0.03 NG/ML — HIGH (ref 0–0.01)
TSH SERPL-MCNC: 2.64 UIU/ML — SIGNIFICANT CHANGE UP (ref 0.27–4.2)
WBC # BLD: 5.12 K/UL — SIGNIFICANT CHANGE UP (ref 3.8–10.5)
WBC # BLD: 8.31 K/UL — SIGNIFICANT CHANGE UP (ref 3.8–10.5)
WBC # BLD: 8.32 K/UL — SIGNIFICANT CHANGE UP (ref 3.8–10.5)
WBC # BLD: 8.81 K/UL — SIGNIFICANT CHANGE UP (ref 3.8–10.5)
WBC # FLD AUTO: 5.12 K/UL — SIGNIFICANT CHANGE UP (ref 3.8–10.5)
WBC # FLD AUTO: 8.31 K/UL — SIGNIFICANT CHANGE UP (ref 3.8–10.5)
WBC # FLD AUTO: 8.32 K/UL — SIGNIFICANT CHANGE UP (ref 3.8–10.5)
WBC # FLD AUTO: 8.81 K/UL — SIGNIFICANT CHANGE UP (ref 3.8–10.5)

## 2023-03-07 PROCEDURE — 76942 ECHO GUIDE FOR BIOPSY: CPT | Mod: 26

## 2023-03-07 PROCEDURE — 36620 INSERTION CATHETER ARTERY: CPT

## 2023-03-07 PROCEDURE — 71045 X-RAY EXAM CHEST 1 VIEW: CPT | Mod: 26,77

## 2023-03-07 PROCEDURE — 99291 CRITICAL CARE FIRST HOUR: CPT | Mod: GC

## 2023-03-07 PROCEDURE — 36556 INSERT NON-TUNNEL CV CATH: CPT

## 2023-03-07 PROCEDURE — 71045 X-RAY EXAM CHEST 1 VIEW: CPT | Mod: 26,76

## 2023-03-07 RX ORDER — THIAMINE MONONITRATE (VIT B1) 100 MG
500 TABLET ORAL EVERY 8 HOURS
Refills: 0 | Status: DISCONTINUED | OUTPATIENT
Start: 2023-03-07 | End: 2023-03-07

## 2023-03-07 RX ORDER — DEXTROSE 50 % IN WATER 50 %
25 SYRINGE (ML) INTRAVENOUS ONCE
Refills: 0 | Status: DISCONTINUED | OUTPATIENT
Start: 2023-03-07 | End: 2023-03-14

## 2023-03-07 RX ORDER — SODIUM CHLORIDE 9 MG/ML
1000 INJECTION, SOLUTION INTRAVENOUS
Refills: 0 | Status: DISCONTINUED | OUTPATIENT
Start: 2023-03-07 | End: 2023-03-14

## 2023-03-07 RX ORDER — MIDAZOLAM HYDROCHLORIDE 1 MG/ML
0.02 INJECTION, SOLUTION INTRAMUSCULAR; INTRAVENOUS
Qty: 100 | Refills: 0 | Status: DISCONTINUED | OUTPATIENT
Start: 2023-03-07 | End: 2023-03-07

## 2023-03-07 RX ORDER — PROPOFOL 10 MG/ML
10 INJECTION, EMULSION INTRAVENOUS
Qty: 1000 | Refills: 0 | Status: DISCONTINUED | OUTPATIENT
Start: 2023-03-07 | End: 2023-03-07

## 2023-03-07 RX ORDER — MIDAZOLAM HYDROCHLORIDE 1 MG/ML
2 INJECTION, SOLUTION INTRAMUSCULAR; INTRAVENOUS ONCE
Refills: 0 | Status: DISCONTINUED | OUTPATIENT
Start: 2023-03-07 | End: 2023-03-07

## 2023-03-07 RX ORDER — CHLORHEXIDINE GLUCONATE 213 G/1000ML
1 SOLUTION TOPICAL
Refills: 0 | Status: DISCONTINUED | OUTPATIENT
Start: 2023-03-07 | End: 2023-03-11

## 2023-03-07 RX ORDER — SODIUM BICARBONATE 1 MEQ/ML
50 SYRINGE (ML) INTRAVENOUS ONCE
Refills: 0 | Status: COMPLETED | OUTPATIENT
Start: 2023-03-07 | End: 2023-03-07

## 2023-03-07 RX ORDER — POTASSIUM CHLORIDE 20 MEQ
20 PACKET (EA) ORAL ONCE
Refills: 0 | Status: COMPLETED | OUTPATIENT
Start: 2023-03-07 | End: 2023-03-07

## 2023-03-07 RX ORDER — VANCOMYCIN HCL 1 G
1000 VIAL (EA) INTRAVENOUS EVERY 24 HOURS
Refills: 0 | Status: DISCONTINUED | OUTPATIENT
Start: 2023-03-07 | End: 2023-03-07

## 2023-03-07 RX ORDER — THIAMINE MONONITRATE (VIT B1) 100 MG
500 TABLET ORAL EVERY 8 HOURS
Refills: 0 | Status: COMPLETED | OUTPATIENT
Start: 2023-03-07 | End: 2023-03-08

## 2023-03-07 RX ORDER — DEXTROSE 50 % IN WATER 50 %
15 SYRINGE (ML) INTRAVENOUS ONCE
Refills: 0 | Status: DISCONTINUED | OUTPATIENT
Start: 2023-03-07 | End: 2023-03-14

## 2023-03-07 RX ORDER — POTASSIUM CHLORIDE 20 MEQ
20 PACKET (EA) ORAL
Refills: 0 | Status: COMPLETED | OUTPATIENT
Start: 2023-03-07 | End: 2023-03-07

## 2023-03-07 RX ORDER — NOREPINEPHRINE BITARTRATE/D5W 8 MG/250ML
0.05 PLASTIC BAG, INJECTION (ML) INTRAVENOUS
Qty: 16 | Refills: 0 | Status: DISCONTINUED | OUTPATIENT
Start: 2023-03-07 | End: 2023-03-07

## 2023-03-07 RX ORDER — VANCOMYCIN HCL 1 G
1000 VIAL (EA) INTRAVENOUS EVERY 12 HOURS
Refills: 0 | Status: DISCONTINUED | OUTPATIENT
Start: 2023-03-07 | End: 2023-03-08

## 2023-03-07 RX ORDER — GLUCAGON INJECTION, SOLUTION 0.5 MG/.1ML
1 INJECTION, SOLUTION SUBCUTANEOUS ONCE
Refills: 0 | Status: DISCONTINUED | OUTPATIENT
Start: 2023-03-07 | End: 2023-03-14

## 2023-03-07 RX ORDER — PIPERACILLIN AND TAZOBACTAM 4; .5 G/20ML; G/20ML
4.5 INJECTION, POWDER, LYOPHILIZED, FOR SOLUTION INTRAVENOUS EVERY 8 HOURS
Refills: 0 | Status: DISCONTINUED | OUTPATIENT
Start: 2023-03-07 | End: 2023-03-08

## 2023-03-07 RX ORDER — POTASSIUM CHLORIDE 20 MEQ
20 PACKET (EA) ORAL ONCE
Refills: 0 | Status: DISCONTINUED | OUTPATIENT
Start: 2023-03-07 | End: 2023-03-07

## 2023-03-07 RX ORDER — CALCIUM GLUCONATE 100 MG/ML
2 VIAL (ML) INTRAVENOUS ONCE
Refills: 0 | Status: COMPLETED | OUTPATIENT
Start: 2023-03-07 | End: 2023-03-07

## 2023-03-07 RX ORDER — PROPOFOL 10 MG/ML
10 INJECTION, EMULSION INTRAVENOUS
Qty: 1000 | Refills: 0 | Status: DISCONTINUED | OUTPATIENT
Start: 2023-03-07 | End: 2023-03-08

## 2023-03-07 RX ORDER — FENTANYL CITRATE 50 UG/ML
50 INJECTION INTRAVENOUS ONCE
Refills: 0 | Status: DISCONTINUED | OUTPATIENT
Start: 2023-03-07 | End: 2023-03-07

## 2023-03-07 RX ORDER — DEXTROSE 50 % IN WATER 50 %
12.5 SYRINGE (ML) INTRAVENOUS ONCE
Refills: 0 | Status: DISCONTINUED | OUTPATIENT
Start: 2023-03-07 | End: 2023-03-14

## 2023-03-07 RX ORDER — SODIUM BICARBONATE 1 MEQ/ML
0.3 SYRINGE (ML) INTRAVENOUS
Qty: 150 | Refills: 0 | Status: DISCONTINUED | OUTPATIENT
Start: 2023-03-07 | End: 2023-03-07

## 2023-03-07 RX ORDER — PERMETHRIN CREAM 5% W/W 50 MG/G
1 CREAM TOPICAL ONCE
Refills: 0 | Status: COMPLETED | OUTPATIENT
Start: 2023-03-07 | End: 2023-03-07

## 2023-03-07 RX ORDER — FOLIC ACID 0.8 MG
1 TABLET ORAL DAILY
Refills: 0 | Status: DISCONTINUED | OUTPATIENT
Start: 2023-03-07 | End: 2023-03-11

## 2023-03-07 RX ORDER — DEXMEDETOMIDINE HYDROCHLORIDE IN 0.9% SODIUM CHLORIDE 4 UG/ML
0.4 INJECTION INTRAVENOUS
Qty: 400 | Refills: 0 | Status: DISCONTINUED | OUTPATIENT
Start: 2023-03-07 | End: 2023-03-10

## 2023-03-07 RX ORDER — FENTANYL CITRATE 50 UG/ML
100 INJECTION INTRAVENOUS ONCE
Refills: 0 | Status: DISCONTINUED | OUTPATIENT
Start: 2023-03-07 | End: 2023-03-07

## 2023-03-07 RX ORDER — PANTOPRAZOLE SODIUM 20 MG/1
40 TABLET, DELAYED RELEASE ORAL
Refills: 0 | Status: DISCONTINUED | OUTPATIENT
Start: 2023-03-07 | End: 2023-03-10

## 2023-03-07 RX ORDER — DEXTROSE 10 % IN WATER 10 %
250 INTRAVENOUS SOLUTION INTRAVENOUS ONCE
Refills: 0 | Status: COMPLETED | OUTPATIENT
Start: 2023-03-07 | End: 2023-03-07

## 2023-03-07 RX ORDER — VANCOMYCIN HCL 1 G
1000 VIAL (EA) INTRAVENOUS EVERY 12 HOURS
Refills: 0 | Status: DISCONTINUED | OUTPATIENT
Start: 2023-03-07 | End: 2023-03-07

## 2023-03-07 RX ORDER — ACYCLOVIR SODIUM 500 MG
700 VIAL (EA) INTRAVENOUS EVERY 8 HOURS
Refills: 0 | Status: DISCONTINUED | OUTPATIENT
Start: 2023-03-07 | End: 2023-03-07

## 2023-03-07 RX ORDER — CHLORHEXIDINE GLUCONATE 213 G/1000ML
15 SOLUTION TOPICAL EVERY 12 HOURS
Refills: 0 | Status: DISCONTINUED | OUTPATIENT
Start: 2023-03-07 | End: 2023-03-08

## 2023-03-07 RX ORDER — INSULIN LISPRO 100/ML
VIAL (ML) SUBCUTANEOUS EVERY 6 HOURS
Refills: 0 | Status: DISCONTINUED | OUTPATIENT
Start: 2023-03-07 | End: 2023-03-14

## 2023-03-07 RX ORDER — FENTANYL CITRATE 50 UG/ML
0.5 INJECTION INTRAVENOUS
Qty: 2500 | Refills: 0 | Status: DISCONTINUED | OUTPATIENT
Start: 2023-03-07 | End: 2023-03-07

## 2023-03-07 RX ADMIN — Medication 100 MILLIEQUIVALENT(S): at 04:04

## 2023-03-07 RX ADMIN — Medication 105 MILLIGRAM(S): at 22:32

## 2023-03-07 RX ADMIN — PANTOPRAZOLE SODIUM 40 MILLIGRAM(S): 20 TABLET, DELAYED RELEASE ORAL at 18:05

## 2023-03-07 RX ADMIN — Medication 1 MILLIGRAM(S): at 12:31

## 2023-03-07 RX ADMIN — Medication 250 MILLIGRAM(S): at 21:30

## 2023-03-07 RX ADMIN — CHLORHEXIDINE GLUCONATE 15 MILLILITER(S): 213 SOLUTION TOPICAL at 05:41

## 2023-03-07 RX ADMIN — PIPERACILLIN AND TAZOBACTAM 25 GRAM(S): 4; .5 INJECTION, POWDER, LYOPHILIZED, FOR SOLUTION INTRAVENOUS at 11:22

## 2023-03-07 RX ADMIN — Medication 50 MILLIEQUIVALENT(S): at 06:53

## 2023-03-07 RX ADMIN — PANTOPRAZOLE SODIUM 40 MILLIGRAM(S): 20 TABLET, DELAYED RELEASE ORAL at 05:41

## 2023-03-07 RX ADMIN — PIPERACILLIN AND TAZOBACTAM 25 GRAM(S): 4; .5 INJECTION, POWDER, LYOPHILIZED, FOR SOLUTION INTRAVENOUS at 03:03

## 2023-03-07 RX ADMIN — CHLORHEXIDINE GLUCONATE 1 APPLICATION(S): 213 SOLUTION TOPICAL at 05:41

## 2023-03-07 RX ADMIN — FENTANYL CITRATE 100 MICROGRAM(S): 50 INJECTION INTRAVENOUS at 02:47

## 2023-03-07 RX ADMIN — Medication 105 MILLIGRAM(S): at 06:52

## 2023-03-07 RX ADMIN — PROPOFOL 4.2 MICROGRAM(S)/KG/MIN: 10 INJECTION, EMULSION INTRAVENOUS at 02:26

## 2023-03-07 RX ADMIN — PROPOFOL 4.2 MICROGRAM(S)/KG/MIN: 10 INJECTION, EMULSION INTRAVENOUS at 05:40

## 2023-03-07 RX ADMIN — Medication 105 MILLIGRAM(S): at 14:01

## 2023-03-07 RX ADMIN — FENTANYL CITRATE 3.5 MICROGRAM(S)/KG/HR: 50 INJECTION INTRAVENOUS at 09:16

## 2023-03-07 RX ADMIN — PIPERACILLIN AND TAZOBACTAM 25 GRAM(S): 4; .5 INJECTION, POWDER, LYOPHILIZED, FOR SOLUTION INTRAVENOUS at 18:05

## 2023-03-07 RX ADMIN — Medication 3.28 MICROGRAM(S)/KG/MIN: at 11:34

## 2023-03-07 RX ADMIN — Medication 50 MILLIEQUIVALENT(S): at 05:42

## 2023-03-07 RX ADMIN — PERMETHRIN CREAM 5% W/W 1 APPLICATION(S): 50 CREAM TOPICAL at 12:49

## 2023-03-07 RX ADMIN — Medication 500 MILLILITER(S): at 14:26

## 2023-03-07 RX ADMIN — Medication 50 MILLIEQUIVALENT(S): at 00:30

## 2023-03-07 RX ADMIN — MIDAZOLAM HYDROCHLORIDE 2 MILLIGRAM(S): 1 INJECTION, SOLUTION INTRAMUSCULAR; INTRAVENOUS at 23:34

## 2023-03-07 RX ADMIN — FENTANYL CITRATE 100 MICROGRAM(S): 50 INJECTION INTRAVENOUS at 02:45

## 2023-03-07 RX ADMIN — FENTANYL CITRATE 3.5 MICROGRAM(S)/KG/HR: 50 INJECTION INTRAVENOUS at 02:29

## 2023-03-07 RX ADMIN — Medication 200 GRAM(S): at 05:41

## 2023-03-07 RX ADMIN — PROPOFOL 4.2 MICROGRAM(S)/KG/MIN: 10 INJECTION, EMULSION INTRAVENOUS at 09:16

## 2023-03-07 RX ADMIN — Medication 114 MILLIGRAM(S): at 04:04

## 2023-03-07 RX ADMIN — Medication 250 MILLIGRAM(S): at 08:57

## 2023-03-07 RX ADMIN — CHLORHEXIDINE GLUCONATE 15 MILLILITER(S): 213 SOLUTION TOPICAL at 18:06

## 2023-03-07 RX ADMIN — MIDAZOLAM HYDROCHLORIDE 1.4 MG/KG/HR: 1 INJECTION, SOLUTION INTRAMUSCULAR; INTRAVENOUS at 00:41

## 2023-03-07 RX ADMIN — FENTANYL CITRATE 3.5 MICROGRAM(S)/KG/HR: 50 INJECTION INTRAVENOUS at 06:54

## 2023-03-07 NOTE — H&P ADULT - HISTORY OF PRESENT ILLNESS
54yo M with unknown PMH presented to [facility] by EMS after being found on the ground. pt admitted to ED provider that he is homeless and living on the streets.  All other HPI as per ED. Pt told ED provider that he had "bed bugs." As per ED provider, pt appeared to have ataxic gait.    At [facility], vitals showed hypothermia of 85F. Was intubated for airway protection (was not hypoxic on room air). Labs showed  wbc 12, Hgb 2.2, retic index 0.42 (hypoproliferative), lactate >15, CO2 <6, AG>30, Cr 2.03 (unknown baseline), BNP 3100, pH (arterial) 7.04. Utox, tylenol, salicylates normal.    CXR and CT Head at [facility] were normal.  Patient had a witnessed seizure in ED (unknown type of seizure) and was keppra loaded.  Also received 4LNS, 5 amps bicarb and vanc/zosyn.-->pH improved to 7.2.  Received 2u prbc-->Hgb increased to 3.3.    Arrived to [facility] MICU on ventilator while on fentanyl and sodium bicarb gtt.

## 2023-03-07 NOTE — H&P ADULT - NSHPPHYSICALEXAM_GEN_ALL_CORE
.  VITAL SIGNS:  T(F): 87.6 (03-06-23 @ 23:14), Max: 87.6 (03-06-23 @ 23:14)  HR: 75 (03-07-23 @ 00:30) (59 - 92)  BP: 117/81 (03-06-23 @ 23:14) (100/66 - 150/85)  BP(mean): --  RR: 22 (03-07-23 @ 00:30) (18 - 22)  SpO2: 100% (03-07-23 @ 00:30) (94% - 100%)    PHYSICAL EXAM:    Constitutional: positioned flat  HEENT: NC/AT, PERRL (3-->2), anicteric sclera, intubated  Neck: supple  Respiratory: CTA B/L; no W/R/R, overbreathing vent  Cardiac: RRR; no M/R/G  Gastrointestinal: soft, NT/ND; no rebound or guarding  Genitourinary: brink  Extremities: warm and dry, no clubbing or cyanosis; no peripheral edema  Musculoskeletal: moving all extremities  Vascular: 2+ radial, DP pulses B/L; R fem TLC  Dermatologic: dirt all over skin; few bugs seen  Neurologic: sedated

## 2023-03-07 NOTE — H&P ADULT - ASSESSMENT
54yo M with unknown PMH, undomiciled, presented to [facility] by EMS after being found on the ground. Arrived to ED with AMS, severe anemia, metabolic acidosis. Intubated for airway protection at [facility]. Continue management at [facility] MICU.      NEURO  #sedated  Arrived on fentanyl.  - start precedex  - c/w fentanyl  - avoid propofol (bradycardia as per ED provider)    #seizure  No known past seizure hx but with witness seizure at [facility]. Was keppra loaded.  CT head wnl.  - vEEG  - Epilepsy consult  - ativan 2mg prn for seizure activity >2min    #metabolic encephalopathy  Reportedly AMS in ED. Unknown baseline mental status though.  - rest of plan as below      CARDIAC  - obtain TTE      PULM  #intubated  Intubated for airway protection. Was not hypoxic on room air in ED.  - c/w ventilator  - 40/450/12/5    GI  PIA    RENAL  #MEERA  Possible MEERA.     SKIN  #bird lice 52yo M with unknown PMH, undomiciled, presented to [facility] by EMS after being found on the ground. Arrived to ED with AMS, severe anemia, metabolic acidosis. Intubated for airway protection at [facility]. Continue management at [facility] MICU.      NEURO  #sedated  Arrived on fentanyl.  - start precedex  - c/w fentanyl  - avoid propofol (bradycardia as per ED provider)    #seizure  No known past seizure hx but with witness seizure at [facility]. Was keppra loaded.  CT head wnl. Utox neg.   - vEEG  - Epilepsy consult  - ativan 2mg prn for seizure activity >2min  - acyclovir    #metabolic encephalopathy  Reportedly AMS in ED. Unknown baseline mental status though.  - rest of plan as below  - thiamine, folic acid    #ataxic gait  Ataxic as per ED. CT head wnl.  - check B12, folate    CARDIAC  #left bundle branch block  Left bundle of unknown chronicity. Upsloping T waves in V2/V3. Trop I wnl.  No clinical signs of HF but BNP 3100.  - obtain TTE  - obtain cardiac enzymes    PULM  #intubated  Intubated for airway protection. Was not hypoxic on room air in ED.  - c/w ventilator  - 40/450/12/5    GI  PIA    RENAL  #MEERA  Possible MEERA.. Unknown baseline.  Likely prerenal component.  Already received fluids so urine lytes will not be accurate.  - UA  - brink for strict I/Os   - renally dose meds    #metabolic acidosis  pH 7.04, improved after pushes of bicarb in ED.  - repeat ABG  - c/w bicarb gtt  - give 2 amps bicarb    HEME  #normocytic anemia  P/w Hgb 2.2 and hypoproliferative retic index. No signs of bleeding. No known malignancies.  Likely acute on chronic. Unknown if has CKD but if so could contribute to AOCD.  s/p 2u prbc in ED.  - f/u sickle cell workup  - check hemolysis but less likely since bilirubin low  - CT a/p when stable  - give 2u prbc here  - check fibrinogen, if <150 and bleeding then give cryo    SKIN  #bird lice   came to [facility] and identified bugs as "bird lice"  - was decontaminated at [facility]  - bugs seen on patient and sheets here  - start permethrin    ENDO  q4h fingersticks  - check TSH    ID  #sepsis  Hypothermic with leukocytosis.  Hypothermia likely 2/2 environment (homeless) vs possible infection.  CXR without consolidation.  - POCUS  - c/w vanc/zosyn  - f/u BCx  - collect UA and sputum cx      F: s/p 4L NS and 2u prbc  E: replenish as appropriate  N: NPO    VTE Prophylaxis: SCD's; no chemical ppx in setting of severe anemia  GI: PPI BID  C: Full Code  D: MICU   54yo M with unknown PMH, undomiciled, presented to [facility] by EMS after being found on the ground. Arrived to ED with AMS, severe anemia, metabolic acidosis. Intubated for airway protection at [facility]. Continue management at [facility] MICU.      NEURO  #sedated  Arrived on fentanyl.  - start precedex  - c/w fentanyl  - avoid propofol (bradycardia as per ED provider)    #seizure  No known past seizure hx but with witness seizure at [facility]. Was keppra loaded.  CT head wnl. Utox neg.   - vEEG  - Epilepsy consult  - ativan 2mg prn for seizure activity >2min  - acyclovir    #metabolic encephalopathy  Reportedly AMS in ED. Unknown baseline mental status though.  - rest of plan as below  - thiamine, folic acid    #ataxic gait  Ataxic as per ED. CT head wnl.  - check B12, folate    CARDIAC  #left bundle branch block  Left bundle of unknown chronicity. Upsloping T waves in V2/V3. Trop I wnl.  No clinical signs of HF but BNP 3100.  - obtain TTE  - obtain cardiac enzymes    PULM  #intubated  Intubated for airway protection. Was not hypoxic on room air in ED.  - c/w ventilator  - 40/450/12/5    GI  PIA    RENAL  #MEERA  Possible MEERA.. Unknown baseline.  Likely prerenal component.  Already received fluids so urine lytes will not be accurate.  - UA  - brink for strict I/Os   - renally dose meds    #metabolic acidosis  pH 7.04, improved after pushes of bicarb in ED.  - repeat ABG  - c/w bicarb gtt  - give 2 amps bicarb    HEME  #normocytic anemia  Likely acute on chronic.   P/w Hgb 2.2 and hypoproliferative retic index. No signs of bleeding. No known malignancies.  Unknown if has CKD but if so could contribute to AOCD.  MCV normocytic so unlikely to have thalassemia.  s/p 2u prbc in ED.  - check HIV, sickle cell workup  - check hemolysis but less likely since bilirubin,K wnl  - CT a/p when stable  - give 2u prbc here  - check fibrinogen, if <150 and bleeding then give cryo    SKIN  #bird lice   came to [facility] and identified bugs as "bird lice"  - was decontaminated at [facility]  - bugs seen on patient and sheets here  - start permethrin    ENDO  q4h fingersticks  - check TSH    ID  #sepsis  Hypothermic with leukocytosis.  Hypothermia likely 2/2 environment (homeless) vs possible infection.  CXR without consolidation.  - POCUS  - c/w vanc/zosyn  - f/u BCx  - collect UA and sputum cx      F: s/p 4L NS and 2u prbc  E: replenish as appropriate  N: NPO    VTE Prophylaxis: SCD's; no chemical ppx in setting of severe anemia  GI: PPI BID  C: Full Code  D: MICU

## 2023-03-07 NOTE — EEG REPORT - NS EEG TEXT BOX
[facility] Department of Neurology  Inpatient Continuous video-Electroencephalogram    Patient Name:	DALTON JUNG    :	1970  MRN:	[id number]    Study Start Date/Time:  3/7/2023, 1:57:11 AM  Study End Date/Time:      Brief Clinical History:  DALTON JUNG is a 53 year old Male; study performed to investigate for seizures or markers of epilepsy.    Diagnosis Code:   R56.9 convulsions/seizure  The live video was: unmonitored.    Pertinent Medications:  n/a    Acquisition Details:  Electroencephalography was acquired using a minimum of 21 channels on an [employer] Neu[employer] system v 9.3.1 with electrode placement according to the standard International 10-20 system following ACNS (American Clinical Neurophysiology Society) guidelines for Long-Term Video EEG monitoring.  Anterior temporal T1 and T2 electrodes were utilized whenever possible.   The [employer] automated spike & seizure detections were all reviewed in detail, in addition to extensive portions of raw EEG.      Day 1: 3/7/2023 @ 1:57:11 AM to morning @ 07:00 am  Background:  nearly continuous (<10% periods of attenuation), with predominantly theta>delta frequencies.  Symmetry:  No persistent asymmetries of voltage or frequency.  Posterior Dominant Rhythm:  No clear PDR   Organization: Absent.  Voltage:  Normal (20+ uV)  Variability: Yes. 		Reactivity: Yes.  N2 sleep: Absent.  Spontaneous Activity:  No epileptiform discharges.  Periodic/rhythmic activity:  None  Events:  No electrographic seizures or significant clinical events.  Provocations:  Hyperventilation and Photic stimulation: was not performed.    Daily Summary:    Continuous Moderate generalized   slowing suggestive of a Moderate degree of diffuse or multifocal  dysfunction.      Caitlin Francisco MD  Attending Neurologist, [facility] Epilepsy Program

## 2023-03-07 NOTE — PROGRESS NOTE ADULT - ASSESSMENT
52yo M with unknown PMH, undomiciled, presented to [facility] by EMS after being found on the ground. Arrived to ED with AMS, severe anemia, metabolic acidosis. Intubated for airway protection at [facility]. Continue management at [facility] MICU.      NEURO  #sedated  Arrived on fentanyl.  - start precedex  - c/w fentanyl  - avoid propofol (bradycardia as per ED provider)    #seizure  No known past seizure hx but with witness seizure at [facility]. Was keppra loaded.  CT head wnl. Utox neg.   - vEEG  - Epilepsy consult  - ativan 2mg prn for seizure activity >2min  - acyclovir    #metabolic encephalopathy  Reportedly AMS in ED. Unknown baseline mental status though.  - rest of plan as below  - thiamine, folic acid    #ataxic gait  Ataxic as per ED. CT head wnl.  - check B12, folate    CARDIAC  #left bundle branch block  Left bundle of unknown chronicity. Upsloping T waves in V2/V3. Trop I wnl.  No clinical signs of HF but BNP 3100.  - obtain TTE  - obtain cardiac enzymes    PULM  #intubated  Intubated for airway protection. Was not hypoxic on room air in ED.  - c/w ventilator  - 40/450/12/5    GI  PIA    RENAL  #MEERA  Possible MEERA.. Unknown baseline.  Likely prerenal component.  Already received fluids so urine lytes will not be accurate.  - UA  - brink for strict I/Os   - renally dose meds    #metabolic acidosis  pH 7.04, improved after pushes of bicarb in ED.  - repeat ABG  - c/w bicarb gtt  - give 2 amps bicarb    HEME  #normocytic anemia  Likely acute on chronic.   P/w Hgb 2.2 and hypoproliferative retic index. No signs of bleeding. No known malignancies.  Unknown if has CKD but if so could contribute to AOCD.  MCV normocytic so unlikely to have thalassemia.  s/p 2u prbc in ED.  - check HIV, sickle cell workup  - check hemolysis but less likely since bilirubin,K wnl  - CT a/p when stable  - give 2u prbc here  - check fibrinogen, if <150 and bleeding then give cryo    SKIN  #bird lice   came to [facility] and identified bugs as "bird lice"  - was decontaminated at [facility]  - bugs seen on patient and sheets here  - start permethrin    ENDO  q4h fingersticks  - check TSH    ID  #sepsis  Hypothermic with leukocytosis.  Hypothermia likely 2/2 environment (homeless) vs possible infection.  CXR without consolidation.  - POCUS  - c/w vanc/zosyn  - f/u BCx  - collect UA and sputum cx      F: s/p 4L NS and 2u prbc  E: replenish as appropriate  N: NPO    VTE Prophylaxis: SCD's; no chemical ppx in setting of severe anemia  GI: PPI BID  C: Full Code  D: MICU   52yo M with unknown PMH, undomiciled, presented to [facility] by EMS after being found on the ground. Arrived to ED with AMS, severe anemia, metabolic acidosis. Intubated for airway protection at [facility]. Continue management at [facility] MICU.      NEURO  #sedated  RASS goal -3 to -4, plan to wean for extubation on 3/7 PM  - c/w fentanyl and propofol  - SAT/SBT on 3/7 PM    #seizure  No known past seizure hx but with witnessed seizure at [facility]. S/p Keppra loading dose  CT head wnl. Utox neg.   - vEEG  - Epilepsy consulted, f/u recs  - ativan 2mg prn for seizure activity >2min    #metabolic encephalopathy  AMS upon arrival to [facility] prior to intubation, unknown baseline mental status, s/p witnessed seizure. Likely 2/2 severe sepsis and anemia.  - severe sepsis and anemia treatment as per below  - start on thiamine and folic acid    #ataxic gait  Ataxic as per ED. CT head wnl.  - f/u B12, folate  - PT once patient is more stable and extubated    CV  #left bundle branch block  Left bundle of unknown chronicity. Upsloping T waves in V2/V3. Trop I wnl.  No clinical signs of HF, BNP 3100. Likely w/ some component of demand ischemia.  - F/u TTE  - repeat cardiac enzymes    #hypovolemic shock  2/2 to PNA vs poor nutrition vs severe sepsis.   Fluid resuscitation + 7 u pRBC, WWP on exam, temperature increasing on ursula hugger  - severe sepsis and anemia plan as per below     PULM  #intubated  Intubated for airway protection after witnessed seizure. Was not hypoxic on room air in ED.  - c/w ventilator  - 40/450/12/5    GI  #OG tube  gastric contents observed on bedside POCUS  - OG tube placed to intermittent medium suction      RENAL  #MEERA  Likely pre-renal MEERA.. Unknown baseline Cr. I/s/o severe sepsis, Cr improving w/ treatment of sepsis.  - brink for strict I/Os   - renally dose meds    #metabolic acidosis  pH 7.04, improved after pushes of bicarb in ED. Repeat ABG 7.3  - repeat ABG  - d/c bicarb ggt    HEME  #normocytic anemia  Likely acute on chronic.   P/w Hgb 2.2 and hypoproliferative retic index. No signs of bleeding. No known malignancies.  Unknown if has CKD but if so could contribute to AOCD.  MCV normocytic so unlikely to have thalassemia.  s/p 7u pRBC. normal hemolysis labs, Hgb now 7.7    - CT A/P when stable  - active T&S  - transfuse for hgb <7  - hold off on FFP, pending repeat coags    SKIN  #bird mites   came to [facility] and identified bugs as "bird mites"  - was decontaminated at [facility]  - bugs seen on patient and sheets at [facility] ICU, and within vEEG dressing  - start permethrin wash  - remove vEEG, apply permethrin wash, restart vEEG    ENDO  - q4h fingersticks  - f/u TSH    ID  #severe sepsis  Hypothermic with leukocytosis, on ursula hugger s/p right femoral warming catheter  Hypothermia likely 2/2 environment and infection.  CXR with left LL consolidation, confirmed on bedside POCUS  - c/w vanc/zosyn  - f/u BCx  - f/u UCx and SCx  - f/u MRSA swab      F: s/p 4L NS and 7u prbc  E: replenish as appropriate  N: NPO    VTE Prophylaxis: SCD's; no chemical ppx in setting of severe anemia  GI: PPI BID  C: Full Code  D: MICU

## 2023-03-07 NOTE — H&P ADULT - ATTENDING COMMENTS
Charlotte Porter  This is an un identified male, he is bout 50 yrs of age, who was found on the streets by EMS.  The patient claims that he has bugs and was taken to the [facility] where he was cleaned.  He had a seizure episode and was given keppra.  His initial blood work shows bicarb of 6, AG >30, pH 6.94, he was given bicarb pushes anbd started on bicarb gtt.  He was intubated for airway protection.  His blood drawn appears very thin and watery, hgb 2g/dL.  Also his T 88F, he was given PRBC transfusion and started on a warmer.  For intubation he received etomidate and rocuronium.  He was also given empiric vanco and zosyn.  CT head does not show any acute findings. In the ICU here his T was below 90F, a temperature CVC was placed and the Zoll set for rewarming.  Multiple discussion for management were made withthe ED physician at [facility] before he was transferred to [facility] ICU.  He was hypotensive with good urine output, levophed was startd.  Bugs confirmed by exterinmnator to be bird lice.  A/P  -AMS with seizure  -AG severe metabolic acidosis with respiratory acidosis  -Bug infestation (bird lice)  -severe anemia with no focus of bleeding, ? bird lice vs internal hemorrhage  -sacral ulcer, un stageable  -shock, on levophed  >video EEG, thiamine, folate  >ventilator support, decrease PEEP to 5, wean FiO2  >CXR  >add acyclovir  >c/w warming via Zoll system  >transfuse PRBC to hgb >7g/dL, when stable - CT chest abd, pelvis.  >bicarb gtt  >FSBG monitoring  Lam see the resident's note for the rest of the details as discussed...  CC time in excess of 120 mins for bedside management

## 2023-03-07 NOTE — PROGRESS NOTE ADULT - SUBJECTIVE AND OBJECTIVE BOX
DALTON CRITICAL 53y Male  MRN#: [id number]     SUBJECTIVE  Overnight: Admitted from [facility] after being found down with AMS, hypothermia to 88 F, bug infestation confirmed to be bird mites by , severe anemia Hgb 2 s/p 7 units pRBC, and septic shock on vanc/zosyn,, intubated, initial ABG w/ pH 6.89, now improved to 7.3, lactate of 15-->3, on versed/ fent for sedation, right radial juan manuel brink, right femoral line, left femoral warming catheter placed.     Patient was seen and examined at bedside. Intubated and sedated, no purposeful movements, no response to stimuli.     OBJECTIVE  PAST MEDICAL & SURGICAL HISTORY  No pertinent past medical history      Home Meds:    ALLERGIES:  Allergy Status Unknown    MEDICATIONS:  STANDING MEDICATIONS  chlorhexidine 0.12% Liquid 15 milliLiter(s) Oral Mucosa every 12 hours  chlorhexidine 2% Cloths 1 Application(s) Topical <User Schedule>  dextrose 5%. 1000 milliLiter(s) IV Continuous <Continuous>  dextrose 5%. 1000 milliLiter(s) IV Continuous <Continuous>  dextrose 50% Injectable 25 Gram(s) IV Push once  dextrose 50% Injectable 12.5 Gram(s) IV Push once  dextrose 50% Injectable 25 Gram(s) IV Push once  fentaNYL   Infusion. 0.5 MICROgram(s)/kG/Hr IV Continuous <Continuous>  folic acid Injectable 1 milliGRAM(s) IV Push daily  glucagon  Injectable 1 milliGRAM(s) IntraMuscular once  insulin lispro (ADMELOG) corrective regimen sliding scale   SubCutaneous every 6 hours  norepinephrine Infusion 0.05 MICROgram(s)/kG/Min IV Continuous <Continuous>  pantoprazole  Injectable 40 milliGRAM(s) IV Push two times a day  piperacillin/tazobactam IVPB.. 4.5 Gram(s) IV Intermittent every 8 hours  propofol Infusion 10 MICROgram(s)/kG/Min IV Continuous <Continuous>  thiamine IVPB 500 milliGRAM(s) IV Intermittent every 8 hours  vancomycin  IVPB 1000 milliGRAM(s) IV Intermittent every 12 hours    PRN MEDICATIONS  dextrose Oral Gel 15 Gram(s) Oral once PRN      VITAL SIGNS: Last 24 Hours  T(C): 34.5 (07 Mar 2023 09:00), Max: 34.5 (07 Mar 2023 09:00)  T(F): 94.1 (07 Mar 2023 09:00), Max: 94.1 (07 Mar 2023 09:00)  HR: 72 (07 Mar 2023 11:00) (49 - 92)  BP: 92/65 (07 Mar 2023 11:00) (84/53 - 150/85)  BP(mean): 74 (07 Mar 2023 11:00) (64 - 92)  RR: 14 (07 Mar 2023 11:00) (14 - 40)  SpO2: 100% (07 Mar 2023 11:00) (94% - 100%)    PHYSICAL EXAM:  Constitutional: resting comfortably in bed; NAD  HEENT: NC/AT, PER, anicteric sclera, no nasal discharge; MMM  Neck: supple; no JVD or thyromegaly  Respiratory: CTA B/L; no W/R/R, no retractions  Cardiac: +S1/S2; RRR; no M/R/G  Gastrointestinal: soft, NT/ND; no rebound or guarding  Back: spine midline, no bony tenderness or step-offs; no CVAT B/L  Extremities: WWP, no clubbing or cyanosis; no peripheral edema  Musculoskeletal: NROM x4; no joint swelling, tenderness or erythema  Vascular: 2+ radial, DP/PT pulses B/L  Dermatologic: skin warm, dry and intact; no rashes, wounds, or scars  Neurologic: AAOx3; CNII-XII grossly intact; no focal deficits  Psychiatric: affect and characteristics of appearance, verbalizations, behaviors are appropriate    LABS:                        7.7    5.12  )-----------( 304      ( 07 Mar 2023 09:20 )             23.5     03-07    140  |  104  |  32<H>  ----------------------------<  140<H>  3.6   |  26  |  1.21    Ca    6.7<L>      07 Mar 2023 06:18  Phos  4.5     03-07  Mg     2.0     03-07    TPro  5.2<L>  /  Alb  2.8<L>  /  TBili  0.6  /  DBili  x   /  AST  57<H>  /  ALT  43  /  AlkPhos  46  03-07    PT/INR - ( 07 Mar 2023 07:09 )   PT: 13.9 sec;   INR: 1.17          PTT - ( 07 Mar 2023 09:20 )  PTT:23.1 sec  Urinalysis Basic - ( 06 Mar 2023 19:47 )    Color: Yellow / Appearance: Clear / SG: >=1.030 / pH: x  Gluc: x / Ketone: NEGATIVE  / Bili: NEGATIVE / Urobili: 0.2 E.U./dL   Blood: x / Protein: 30 mg/dL / Nitrite: NEGATIVE   Leuk Esterase: NEGATIVE / RBC: < 5 /HPF / WBC 5-10 /HPF   Sq Epi: x / Non Sq Epi: 0-5 /HPF / Bacteria: Present /HPF      ABG - ( 07 Mar 2023 01:41 )  pH, Arterial: 7.30  pH, Blood: x     /  pCO2: 42    /  pO2: 421   / HCO3: 21    / Base Excess: -5.5  /  SaO2: 100.0             Creatine Kinase, Serum: 318 U/L *H* (03-07-23 @ 09:20)  Troponin T, Serum: 0.03 ng/mL *H* (03-07-23 @ 09:20)  Lactate, Blood: 3.0 mmol/L *H* (03-07-23 @ 06:24)  Troponin T, Serum: 0.02 ng/mL *H* (03-07-23 @ 06:18)  Creatine Kinase, Serum: 383 U/L *H* (03-07-23 @ 01:49)  Lactate, Blood: 8.0 mmol/L *HH* (03-07-23 @ 01:49)  Troponin T, Serum: 0.02 ng/mL *H* (03-07-23 @ 01:49)  Creatine Kinase, Serum: 381 U/L *H* (03-07-23 @ 01:49)  Lactate, Blood: 14.7 mmoL/L *HH* (03-06-23 @ 22:48)  Creatine Kinase, Serum: 464 U/L *H* (03-06-23 @ 20:18)  Lactate, Blood: >15.0 mmoL/L *HH* (03-06-23 @ 20:17)  Lactate, Blood: >15.0 mmoL/L *HH* (03-06-23 @ 19:21)      CARDIAC MARKERS ( 07 Mar 2023 09:20 )  x     / 0.03 ng/mL / 318 U/L / x     / 12.7 ng/mL  CARDIAC MARKERS ( 07 Mar 2023 06:18 )  x     / 0.02 ng/mL / x     / x     / x      CARDIAC MARKERS ( 07 Mar 2023 01:49 )  x     / 0.02 ng/mL / 383 U/L / x     / 11.9 ng/mL  CARDIAC MARKERS ( 06 Mar 2023 20:18 )  x     / x     / 464 U/L / x     / x          RADIOLOGY:     DALTON CRITICAL 53y Male  MRN#: [id number]     SUBJECTIVE  Overnight: Admitted from [facility] after being found down with AMS, hypothermia to 88 F, bug infestation confirmed to be bird mites by , severe anemia Hgb 2 s/p 7 units pRBC, and septic shock on vanc/zosyn,, intubated, initial ABG w/ pH 6.89, now improved to 7.3, lactate of 15-->3, on versed/ fent for sedation, right radial juan manuel brink, right femoral line, left femoral warming catheter placed.     Patient was seen and examined at bedside. Intubated and sedated, no purposeful movements, no response to stimuli.     OBJECTIVE  PAST MEDICAL & SURGICAL HISTORY  No pertinent past medical history      Home Meds:    ALLERGIES:  Allergy Status Unknown    MEDICATIONS:  STANDING MEDICATIONS  chlorhexidine 0.12% Liquid 15 milliLiter(s) Oral Mucosa every 12 hours  chlorhexidine 2% Cloths 1 Application(s) Topical <User Schedule>  dextrose 5%. 1000 milliLiter(s) IV Continuous <Continuous>  dextrose 5%. 1000 milliLiter(s) IV Continuous <Continuous>  dextrose 50% Injectable 25 Gram(s) IV Push once  dextrose 50% Injectable 12.5 Gram(s) IV Push once  dextrose 50% Injectable 25 Gram(s) IV Push once  fentaNYL   Infusion. 0.5 MICROgram(s)/kG/Hr IV Continuous <Continuous>  folic acid Injectable 1 milliGRAM(s) IV Push daily  glucagon  Injectable 1 milliGRAM(s) IntraMuscular once  insulin lispro (ADMELOG) corrective regimen sliding scale   SubCutaneous every 6 hours  norepinephrine Infusion 0.05 MICROgram(s)/kG/Min IV Continuous <Continuous>  pantoprazole  Injectable 40 milliGRAM(s) IV Push two times a day  piperacillin/tazobactam IVPB.. 4.5 Gram(s) IV Intermittent every 8 hours  propofol Infusion 10 MICROgram(s)/kG/Min IV Continuous <Continuous>  thiamine IVPB 500 milliGRAM(s) IV Intermittent every 8 hours  vancomycin  IVPB 1000 milliGRAM(s) IV Intermittent every 12 hours    PRN MEDICATIONS  dextrose Oral Gel 15 Gram(s) Oral once PRN      VITAL SIGNS: Last 24 Hours  T(C): 34.5 (07 Mar 2023 09:00), Max: 34.5 (07 Mar 2023 09:00)  T(F): 94.1 (07 Mar 2023 09:00), Max: 94.1 (07 Mar 2023 09:00)  HR: 72 (07 Mar 2023 11:00) (49 - 92)  BP: 92/65 (07 Mar 2023 11:00) (84/53 - 150/85)  BP(mean): 74 (07 Mar 2023 11:00) (64 - 92)  RR: 14 (07 Mar 2023 11:00) (14 - 40)  SpO2: 100% (07 Mar 2023 11:00) (94% - 100%)    PHYSICAL EXAM:  Constitutional: thin male, intubated and sedated, no apparent distress, no spontaneous movements, vEEG in place, moving insects observed in vEEG dressings  HEENT: head covered by vEEG, no observed lesions, PERRL, anicteric sclera, no nasal discharge; dry mucus membranes  Neck: supple; no JVD or thyromegaly, no lymphadenopathy  Respiratory: CTA B/L; no W/R/R  Cardiac: +S1/S2; RRR; no M/R/G  Gastrointestinal: soft, mildly distended; no palpable massess  Extremities: WWP, no clubbing or cyanosis; no peripheral edema  Musculoskeletal: no joint swelling or erythema  Vascular: 2+ radial, DP/PT pulses B/L  Dermatologic: skin warm, dry and intact; no rashes, wounds, or scars, no bite marks, no erythema noted  Neurologic: AAOx0, sedated, no UMN signs, does not withdraw to noxious stimuli    LABS:                        7.7    5.12  )-----------( 304      ( 07 Mar 2023 09:20 )             23.5     03-07    140  |  104  |  32<H>  ----------------------------<  140<H>  3.6   |  26  |  1.21    Ca    6.7<L>      07 Mar 2023 06:18  Phos  4.5     03-07  Mg     2.0     03-07    TPro  5.2<L>  /  Alb  2.8<L>  /  TBili  0.6  /  DBili  x   /  AST  57<H>  /  ALT  43  /  AlkPhos  46  03-07    PT/INR - ( 07 Mar 2023 07:09 )   PT: 13.9 sec;   INR: 1.17          PTT - ( 07 Mar 2023 09:20 )  PTT:23.1 sec  Urinalysis Basic - ( 06 Mar 2023 19:47 )    Color: Yellow / Appearance: Clear / SG: >=1.030 / pH: x  Gluc: x / Ketone: NEGATIVE  / Bili: NEGATIVE / Urobili: 0.2 E.U./dL   Blood: x / Protein: 30 mg/dL / Nitrite: NEGATIVE   Leuk Esterase: NEGATIVE / RBC: < 5 /HPF / WBC 5-10 /HPF   Sq Epi: x / Non Sq Epi: 0-5 /HPF / Bacteria: Present /HPF      ABG - ( 07 Mar 2023 01:41 )  pH, Arterial: 7.30  pH, Blood: x     /  pCO2: 42    /  pO2: 421   / HCO3: 21    / Base Excess: -5.5  /  SaO2: 100.0     Creatine Kinase, Serum: 318 U/L *H* (03-07-23 @ 09:20)  Troponin T, Serum: 0.03 ng/mL *H* (03-07-23 @ 09:20)  Lactate, Blood: 3.0 mmol/L *H* (03-07-23 @ 06:24)  Troponin T, Serum: 0.02 ng/mL *H* (03-07-23 @ 06:18)  Creatine Kinase, Serum: 383 U/L *H* (03-07-23 @ 01:49)  Lactate, Blood: 8.0 mmol/L *HH* (03-07-23 @ 01:49)  Troponin T, Serum: 0.02 ng/mL *H* (03-07-23 @ 01:49)  Creatine Kinase, Serum: 381 U/L *H* (03-07-23 @ 01:49)  Lactate, Blood: 14.7 mmoL/L *HH* (03-06-23 @ 22:48)  Creatine Kinase, Serum: 464 U/L *H* (03-06-23 @ 20:18)  Lactate, Blood: >15.0 mmoL/L *HH* (03-06-23 @ 20:17)  Lactate, Blood: >15.0 mmoL/L *HH* (03-06-23 @ 19:21)      CARDIAC MARKERS ( 07 Mar 2023 09:20 )  x     / 0.03 ng/mL / 318 U/L / x     / 12.7 ng/mL  CARDIAC MARKERS ( 07 Mar 2023 06:18 )  x     / 0.02 ng/mL / x     / x     / x      CARDIAC MARKERS ( 07 Mar 2023 01:49 )  x     / 0.02 ng/mL / 383 U/L / x     / 11.9 ng/mL  CARDIAC MARKERS ( 06 Mar 2023 20:18 )  x     / x     / 464 U/L / x     / x          RADIOLOGY:

## 2023-03-07 NOTE — H&P ADULT - CRITICAL CARE ATTENDING COMMENT
Warming catheter placed in the left femoral.  Multiple blood transfusions given, numerous blood gasses done ventilator management.

## 2023-03-07 NOTE — H&P ADULT - NSHPLABSRESULTS_GEN_ALL_CORE
.  LABS:                         3.9    9.73  )-----------( 359      ( 06 Mar 2023 22:47 )             13.3     03-06    139  |  101  |  37<H>  ----------------------------<  272<H>  3.7   |  15<L>  |  1.73<H>    Ca    7.1<L>      06 Mar 2023 22:47    TPro  7.1  /  Alb  2.7<L>  /  TBili  0.2  /  DBili  x   /  AST  44<H>  /  ALT  48<H>  /  AlkPhos  47  03-06    PT/INR - ( 06 Mar 2023 19:47 )   PT: 14.4 sec;   INR: 1.23          PTT - ( 06 Mar 2023 19:47 )  PTT:26.3 sec  Urinalysis Basic - ( 06 Mar 2023 19:47 )    Color: Yellow / Appearance: Clear / SG: >=1.030 / pH: x  Gluc: x / Ketone: NEGATIVE  / Bili: NEGATIVE / Urobili: 0.2 E.U./dL   Blood: x / Protein: 30 mg/dL / Nitrite: NEGATIVE   Leuk Esterase: NEGATIVE / RBC: < 5 /HPF / WBC 5-10 /HPF   Sq Epi: x / Non Sq Epi: 0-5 /HPF / Bacteria: Present /HPF      CARDIAC MARKERS ( 06 Mar 2023 20:18 )  x     / x     / 464 U/L / x     / x          Serum Pro-Brain Natriuretic Peptide: 3114 pg/mL (03-06 @ 19:27)    Lactate, Blood: 14.7 mmoL/L (03-06 @ 22:48)  Lactate, Blood: >15.0 mmoL/L (03-06 @ 20:17)  Lactate, Blood: >15.0 mmoL/L (03-06 @ 19:21)      RADIOLOGY, EKG & ADDITIONAL TESTS: Reviewed.

## 2023-03-08 LAB
ALBUMIN SERPL ELPH-MCNC: 2.5 G/DL — LOW (ref 3.3–5)
ALP SERPL-CCNC: 49 U/L — SIGNIFICANT CHANGE UP (ref 40–120)
ALT FLD-CCNC: 39 U/L — SIGNIFICANT CHANGE UP (ref 10–45)
ANION GAP SERPL CALC-SCNC: 7 MMOL/L — SIGNIFICANT CHANGE UP (ref 5–17)
ANISOCYTOSIS BLD QL: SIGNIFICANT CHANGE UP
AST SERPL-CCNC: 37 U/L — SIGNIFICANT CHANGE UP (ref 10–40)
BASOPHILS # BLD AUTO: 0 K/UL — SIGNIFICANT CHANGE UP (ref 0–0.2)
BASOPHILS NFR BLD AUTO: 0 % — SIGNIFICANT CHANGE UP (ref 0–2)
BILIRUB SERPL-MCNC: 0.4 MG/DL — SIGNIFICANT CHANGE UP (ref 0.2–1.2)
BUN SERPL-MCNC: 23 MG/DL — SIGNIFICANT CHANGE UP (ref 7–23)
CA-I BLD-SCNC: 3.9 MG/DL — LOW (ref 4.5–5.6)
CALCIUM SERPL-MCNC: 7.8 MG/DL — LOW (ref 8.4–10.5)
CHLORIDE SERPL-SCNC: 109 MMOL/L — HIGH (ref 96–108)
CO2 SERPL-SCNC: 27 MMOL/L — SIGNIFICANT CHANGE UP (ref 22–31)
CREAT SERPL-MCNC: 1.26 MG/DL — SIGNIFICANT CHANGE UP (ref 0.5–1.3)
DACRYOCYTES BLD QL SMEAR: SLIGHT — SIGNIFICANT CHANGE UP
DOHLE BOD BLD QL SMEAR: PRESENT — SIGNIFICANT CHANGE UP
EGFR: 68 ML/MIN/1.73M2 — SIGNIFICANT CHANGE UP
ELLIPTOCYTES BLD QL SMEAR: SLIGHT — SIGNIFICANT CHANGE UP
EOSINOPHIL # BLD AUTO: 0 K/UL — SIGNIFICANT CHANGE UP (ref 0–0.5)
EOSINOPHIL NFR BLD AUTO: 0 % — SIGNIFICANT CHANGE UP (ref 0–6)
GIANT PLATELETS BLD QL SMEAR: PRESENT — SIGNIFICANT CHANGE UP
GLUCOSE BLDC GLUCOMTR-MCNC: 102 MG/DL — HIGH (ref 70–99)
GLUCOSE BLDC GLUCOMTR-MCNC: 105 MG/DL — HIGH (ref 70–99)
GLUCOSE BLDC GLUCOMTR-MCNC: 85 MG/DL — SIGNIFICANT CHANGE UP (ref 70–99)
GLUCOSE BLDC GLUCOMTR-MCNC: 89 MG/DL — SIGNIFICANT CHANGE UP (ref 70–99)
GLUCOSE SERPL-MCNC: 101 MG/DL — HIGH (ref 70–99)
GRAM STN FLD: SIGNIFICANT CHANGE UP
HCT VFR BLD CALC: 22.6 % — LOW (ref 39–50)
HGB BLD-MCNC: 7.6 G/DL — LOW (ref 13–17)
HYPOCHROMIA BLD QL: SLIGHT — SIGNIFICANT CHANGE UP
LYMPHOCYTES # BLD AUTO: 0 % — LOW (ref 13–44)
LYMPHOCYTES # BLD AUTO: 0 K/UL — LOW (ref 1–3.3)
MAGNESIUM SERPL-MCNC: 2.3 MG/DL — SIGNIFICANT CHANGE UP (ref 1.6–2.6)
MANUAL SMEAR VERIFICATION: SIGNIFICANT CHANGE UP
MCHC RBC-ENTMCNC: 26.4 PG — LOW (ref 27–34)
MCHC RBC-ENTMCNC: 33.6 GM/DL — SIGNIFICANT CHANGE UP (ref 32–36)
MCV RBC AUTO: 78.5 FL — LOW (ref 80–100)
MICROCYTES BLD QL: SLIGHT — SIGNIFICANT CHANGE UP
MONOCYTES # BLD AUTO: 0.36 K/UL — SIGNIFICANT CHANGE UP (ref 0–0.9)
MONOCYTES NFR BLD AUTO: 2.6 % — SIGNIFICANT CHANGE UP (ref 2–14)
NEUTROPHILS # BLD AUTO: 13.41 K/UL — HIGH (ref 1.8–7.4)
NEUTROPHILS NFR BLD AUTO: 95.7 % — HIGH (ref 43–77)
NEUTS BAND # BLD: 1.7 % — SIGNIFICANT CHANGE UP (ref 0–8)
OVALOCYTES BLD QL SMEAR: SLIGHT — SIGNIFICANT CHANGE UP
PHOSPHATE SERPL-MCNC: 3.8 MG/DL — SIGNIFICANT CHANGE UP (ref 2.5–4.5)
PLAT MORPH BLD: NORMAL — SIGNIFICANT CHANGE UP
PLATELET # BLD AUTO: 226 K/UL — SIGNIFICANT CHANGE UP (ref 150–400)
POIKILOCYTOSIS BLD QL AUTO: SLIGHT — SIGNIFICANT CHANGE UP
POLYCHROMASIA BLD QL SMEAR: SLIGHT — SIGNIFICANT CHANGE UP
POTASSIUM SERPL-MCNC: 4 MMOL/L — SIGNIFICANT CHANGE UP (ref 3.5–5.3)
POTASSIUM SERPL-SCNC: 4 MMOL/L — SIGNIFICANT CHANGE UP (ref 3.5–5.3)
PROT SERPL-MCNC: 5.6 G/DL — LOW (ref 6–8.3)
RBC # BLD: 2.88 M/UL — LOW (ref 4.2–5.8)
RBC # FLD: 22.1 % — HIGH (ref 10.3–14.5)
RBC BLD AUTO: ABNORMAL
SCHISTOCYTES BLD QL AUTO: SLIGHT — SIGNIFICANT CHANGE UP
SODIUM SERPL-SCNC: 143 MMOL/L — SIGNIFICANT CHANGE UP (ref 135–145)
SPECIMEN SOURCE: SIGNIFICANT CHANGE UP
TARGETS BLD QL SMEAR: SLIGHT — SIGNIFICANT CHANGE UP
TRIGL SERPL-MCNC: 71 MG/DL — SIGNIFICANT CHANGE UP
VANCOMYCIN TROUGH SERPL-MCNC: 8.3 UG/ML — LOW (ref 10–20)
WBC # BLD: 13.77 K/UL — HIGH (ref 3.8–10.5)
WBC # FLD AUTO: 13.77 K/UL — HIGH (ref 3.8–10.5)

## 2023-03-08 PROCEDURE — 36000 PLACE NEEDLE IN VEIN: CPT

## 2023-03-08 PROCEDURE — 99291 CRITICAL CARE FIRST HOUR: CPT | Mod: GC

## 2023-03-08 PROCEDURE — 76937 US GUIDE VASCULAR ACCESS: CPT | Mod: 26

## 2023-03-08 PROCEDURE — 95720 EEG PHY/QHP EA INCR W/VEEG: CPT

## 2023-03-08 RX ORDER — MIDAZOLAM HYDROCHLORIDE 1 MG/ML
2 INJECTION, SOLUTION INTRAMUSCULAR; INTRAVENOUS ONCE
Refills: 0 | Status: DISCONTINUED | OUTPATIENT
Start: 2023-03-08 | End: 2023-03-08

## 2023-03-08 RX ORDER — HALOPERIDOL DECANOATE 100 MG/ML
5 INJECTION INTRAMUSCULAR ONCE
Refills: 0 | Status: COMPLETED | OUTPATIENT
Start: 2023-03-08 | End: 2023-03-08

## 2023-03-08 RX ORDER — HEPARIN SODIUM 5000 [USP'U]/ML
5000 INJECTION INTRAVENOUS; SUBCUTANEOUS EVERY 8 HOURS
Refills: 0 | Status: DISCONTINUED | OUTPATIENT
Start: 2023-03-08 | End: 2023-03-14

## 2023-03-08 RX ORDER — HALOPERIDOL DECANOATE 100 MG/ML
2 INJECTION INTRAMUSCULAR ONCE
Refills: 0 | Status: COMPLETED | OUTPATIENT
Start: 2023-03-08 | End: 2023-03-08

## 2023-03-08 RX ORDER — FENTANYL CITRATE 50 UG/ML
50 INJECTION INTRAVENOUS ONCE
Refills: 0 | Status: DISCONTINUED | OUTPATIENT
Start: 2023-03-08 | End: 2023-03-08

## 2023-03-08 RX ORDER — PIPERACILLIN AND TAZOBACTAM 4; .5 G/20ML; G/20ML
3.38 INJECTION, POWDER, LYOPHILIZED, FOR SOLUTION INTRAVENOUS EVERY 8 HOURS
Refills: 0 | Status: DISCONTINUED | OUTPATIENT
Start: 2023-03-08 | End: 2023-03-09

## 2023-03-08 RX ORDER — FENTANYL CITRATE 50 UG/ML
25 INJECTION INTRAVENOUS ONCE
Refills: 0 | Status: DISCONTINUED | OUTPATIENT
Start: 2023-03-08 | End: 2023-03-08

## 2023-03-08 RX ADMIN — DEXMEDETOMIDINE HYDROCHLORIDE IN 0.9% SODIUM CHLORIDE 7 MICROGRAM(S)/KG/HR: 4 INJECTION INTRAVENOUS at 05:37

## 2023-03-08 RX ADMIN — CHLORHEXIDINE GLUCONATE 15 MILLILITER(S): 213 SOLUTION TOPICAL at 05:37

## 2023-03-08 RX ADMIN — MIDAZOLAM HYDROCHLORIDE 2 MILLIGRAM(S): 1 INJECTION, SOLUTION INTRAMUSCULAR; INTRAVENOUS at 07:10

## 2023-03-08 RX ADMIN — Medication 1 MILLIGRAM(S): at 11:04

## 2023-03-08 RX ADMIN — FENTANYL CITRATE 25 MICROGRAM(S): 50 INJECTION INTRAVENOUS at 07:59

## 2023-03-08 RX ADMIN — HEPARIN SODIUM 5000 UNIT(S): 5000 INJECTION INTRAVENOUS; SUBCUTANEOUS at 16:43

## 2023-03-08 RX ADMIN — HALOPERIDOL DECANOATE 5 MILLIGRAM(S): 100 INJECTION INTRAMUSCULAR at 19:54

## 2023-03-08 RX ADMIN — Medication 105 MILLIGRAM(S): at 06:12

## 2023-03-08 RX ADMIN — FENTANYL CITRATE 50 MICROGRAM(S): 50 INJECTION INTRAVENOUS at 00:51

## 2023-03-08 RX ADMIN — DEXMEDETOMIDINE HYDROCHLORIDE IN 0.9% SODIUM CHLORIDE 7 MICROGRAM(S)/KG/HR: 4 INJECTION INTRAVENOUS at 01:28

## 2023-03-08 RX ADMIN — Medication 105 MILLIGRAM(S): at 22:13

## 2023-03-08 RX ADMIN — HEPARIN SODIUM 5000 UNIT(S): 5000 INJECTION INTRAVENOUS; SUBCUTANEOUS at 23:34

## 2023-03-08 RX ADMIN — FENTANYL CITRATE 50 MICROGRAM(S): 50 INJECTION INTRAVENOUS at 07:59

## 2023-03-08 RX ADMIN — PIPERACILLIN AND TAZOBACTAM 25 GRAM(S): 4; .5 INJECTION, POWDER, LYOPHILIZED, FOR SOLUTION INTRAVENOUS at 18:03

## 2023-03-08 RX ADMIN — PANTOPRAZOLE SODIUM 40 MILLIGRAM(S): 20 TABLET, DELAYED RELEASE ORAL at 18:03

## 2023-03-08 RX ADMIN — Medication 105 MILLIGRAM(S): at 13:30

## 2023-03-08 RX ADMIN — DEXMEDETOMIDINE HYDROCHLORIDE IN 0.9% SODIUM CHLORIDE 7 MICROGRAM(S)/KG/HR: 4 INJECTION INTRAVENOUS at 13:29

## 2023-03-08 RX ADMIN — PANTOPRAZOLE SODIUM 40 MILLIGRAM(S): 20 TABLET, DELAYED RELEASE ORAL at 05:37

## 2023-03-08 RX ADMIN — FENTANYL CITRATE 50 MICROGRAM(S): 50 INJECTION INTRAVENOUS at 06:48

## 2023-03-08 RX ADMIN — PIPERACILLIN AND TAZOBACTAM 25 GRAM(S): 4; .5 INJECTION, POWDER, LYOPHILIZED, FOR SOLUTION INTRAVENOUS at 01:24

## 2023-03-08 RX ADMIN — DEXMEDETOMIDINE HYDROCHLORIDE IN 0.9% SODIUM CHLORIDE 7 MICROGRAM(S)/KG/HR: 4 INJECTION INTRAVENOUS at 22:12

## 2023-03-08 RX ADMIN — CHLORHEXIDINE GLUCONATE 1 APPLICATION(S): 213 SOLUTION TOPICAL at 05:38

## 2023-03-08 RX ADMIN — PIPERACILLIN AND TAZOBACTAM 25 GRAM(S): 4; .5 INJECTION, POWDER, LYOPHILIZED, FOR SOLUTION INTRAVENOUS at 11:04

## 2023-03-08 RX ADMIN — FENTANYL CITRATE 25 MICROGRAM(S): 50 INJECTION INTRAVENOUS at 06:09

## 2023-03-08 RX ADMIN — HALOPERIDOL DECANOATE 2 MILLIGRAM(S): 100 INJECTION INTRAMUSCULAR at 19:54

## 2023-03-08 RX ADMIN — FENTANYL CITRATE 50 MICROGRAM(S): 50 INJECTION INTRAVENOUS at 00:01

## 2023-03-08 NOTE — DIETITIAN INITIAL EVALUATION ADULT - ADD RECOMMEND
-Initiate nutrition within 24-48 hrs    *If able to extubate, recommend dysphagia screen and regular diet with appropriate textures/consistencies    *If unable to extubate, recommend initiate enteral nutrition support via NGT; gather ht value to determine IBW for estimated needs and TF recommendations  -Gather ht value; perform complete NFPE as able at f/u  -Monitor chemistry, GI fxn, and skin integrity

## 2023-03-08 NOTE — PROGRESS NOTE ADULT - SUBJECTIVE AND OBJECTIVE BOX
OVERNIGHT EVENTS:  Off pressors and sedation around 10pm. Later he became very agitated, was given Fent 50mcg and Versed 2mg;  He was briefly hypoxic to 83% on CPAP (FiO2 40%), so he was switched back to VCAC and started on max precedex; sats back to 100%, CXR stable from prior. L fem central line taken out.    SUBJECTIVE / INTERVAL HPI: Patient seen and examined at bedside. This morning he is intubated, sedated, on Precedex. He is unresponsive to verbal stimuli and pain. Unable to complete ROS due to mental status.     VITAL SIGNS:  Vital Signs Last 24 Hrs  T(C): 36.6 (08 Mar 2023 09:38), Max: 37.7 (07 Mar 2023 21:20)  T(F): 97.9 (08 Mar 2023 09:38), Max: 99.9 (07 Mar 2023 21:20)  HR: 65 (08 Mar 2023 10:00) (65 - 108)  BP: 95/64 (08 Mar 2023 10:00) (91/56 - 143/66)  BP(mean): 75 (08 Mar 2023 10:00) (68 - 94)  RR: 22 (08 Mar 2023 10:00) (12 - 43)  SpO2: 97% (08 Mar 2023 10:00) (81% - 100%)    Parameters below as of 08 Mar 2023 10:00  Patient On (Oxygen Delivery Method): ventilator    O2 Concentration (%): 40    PHYSICAL EXAM:  Constitutional: intubated and sedated, no apparent distress, no spontaneous movements, vEEG in place;  HEENT: head covered by vEEG, no observed lesions, PERRL, anicteric sclera, no nasal discharge; dry mucus membranes;  Neck: supple; no JVD or thyromegaly, no lymphadenopathy  Respiratory: CTA B/L; no W/R/R  Cardiac: +S1/S2; RRR; no M/R/G  Gastrointestinal: soft, mildly distended; no palpable masses  Extremities: WWP, no clubbing or cyanosis; no peripheral edema  Musculoskeletal: no joint swelling or erythema  Vascular: 2+ radial, DP/PT pulses B/L  Dermatologic: skin warm, dry and intact; no rashes, wounds, or scars, no bite marks, no erythema noted  Neurologic: AAOx0, sedated, unresponsive to verbal stimuli and pain.     MEDICATIONS:  MEDICATIONS  (STANDING):  chlorhexidine 0.12% Liquid 15 milliLiter(s) Oral Mucosa every 12 hours  chlorhexidine 2% Cloths 1 Application(s) Topical <User Schedule>  dexMEDEtomidine Infusion 0.4 MICROgram(s)/kG/Hr (7 mL/Hr) IV Continuous <Continuous>  dextrose 5%. 1000 milliLiter(s) (100 mL/Hr) IV Continuous <Continuous>  dextrose 5%. 1000 milliLiter(s) (50 mL/Hr) IV Continuous <Continuous>  dextrose 50% Injectable 25 Gram(s) IV Push once  dextrose 50% Injectable 12.5 Gram(s) IV Push once  dextrose 50% Injectable 25 Gram(s) IV Push once  folic acid Injectable 1 milliGRAM(s) IV Push daily  glucagon  Injectable 1 milliGRAM(s) IntraMuscular once  heparin   Injectable 5000 Unit(s) SubCutaneous every 8 hours  insulin lispro (ADMELOG) corrective regimen sliding scale   SubCutaneous every 6 hours  pantoprazole  Injectable 40 milliGRAM(s) IV Push two times a day  piperacillin/tazobactam IVPB.. 3.375 Gram(s) IV Intermittent every 8 hours  thiamine IVPB 500 milliGRAM(s) IV Intermittent every 8 hours    MEDICATIONS  (PRN):  dextrose Oral Gel 15 Gram(s) Oral once PRN Blood Glucose LESS THAN 70 milliGRAM(s)/deciliter      ALLERGIES:  Allergies    Allergy Status Unknown    Intolerances        LABS:                        7.6    13.77 )-----------( 226      ( 08 Mar 2023 06:06 )             22.6     03-08    143  |  109<H>  |  23  ----------------------------<  101<H>  4.0   |  27  |  1.26    Ca    7.8<L>      08 Mar 2023 06:06  Phos  3.8     03-08  Mg     2.3     03-08    TPro  5.6<L>  /  Alb  2.5<L>  /  TBili  0.4  /  DBili  x   /  AST  37  /  ALT  39  /  AlkPhos  49  03-08    PT/INR - ( 07 Mar 2023 07:09 )   PT: 13.9 sec;   INR: 1.17          PTT - ( 07 Mar 2023 09:20 )  PTT:23.1 sec  Urinalysis Basic - ( 06 Mar 2023 19:47 )    Color: Yellow / Appearance: Clear / SG: >=1.030 / pH: x  Gluc: x / Ketone: NEGATIVE  / Bili: NEGATIVE / Urobili: 0.2 E.U./dL   Blood: x / Protein: 30 mg/dL / Nitrite: NEGATIVE   Leuk Esterase: NEGATIVE / RBC: < 5 /HPF / WBC 5-10 /HPF   Sq Epi: x / Non Sq Epi: 0-5 /HPF / Bacteria: Present /HPF      CAPILLARY BLOOD GLUCOSE      POCT Blood Glucose.: 102 mg/dL (08 Mar 2023 11:09)      RADIOLOGY & ADDITIONAL TESTS: Reviewed.

## 2023-03-08 NOTE — PROGRESS NOTE ADULT - ASSESSMENT
52yo M with unknown PMH, undomiciled, presented to [facility] by EMS after being found on the ground. Arrived to ED with AMS, severe anemia, metabolic acidosis. Intubated for airway protection at [facility]. Admitted to MICU for further management.      NEURO  #sedated  RASS goal -3 to -4. Yesterday, had planned to wean for extubation, however when off sedation, patient w/ left eye deviation (also seen at [facility] when he had a witnessed seizure). Placed back on vEEG for further monitoring, with plan for extubation on 3/8  - wean precedex  - SAT/SBT on 3/8 PM    #seizure  No known past seizure hx but with witnessed seizure at [facility] w/ left eye deviation during seizure. S/p Keppra loading dose  CT head wnl. Utox neg.   - vEEG reports 3/8: Moderate generalized slowing suggestive of a moderate degree of diffuse or multifocal dysfunction. Some improvement in background slowing from previous day.   - Epilepsy consulted, f/u recs  - ativan 2mg prn for seizure activity >2min    #metabolic encephalopathy  AMS upon arrival to [facility] prior to intubation, unknown baseline mental status, s/p witnessed seizure. Likely 2/2 severe sepsis and anemia.  - severe sepsis and anemia treatment as per below  - c/w thiamine and folic acid    #ataxic gait  Ataxic as per ED. CT head wnl.  - f/u B12, folate  - PT once patient is more stable and extubated    CV  #left bundle branch block  Left bundle of unknown chronicity. Upsloping T waves in V2/V3. Trop I wnl.  No clinical signs of HF, BNP 3100. Likely w/ some component of demand ischemia.  - F/u TTE  - repeat cardiac enzymes    #hypovolemic shock  2/2 to PNA vs poor nutrition vs severe sepsis.   Fluid resuscitation + 7 u pRBC, WWP on exam, temperature increasing on ursula hugger  - severe sepsis and anemia plan as per below     PULM  #intubated  Intubated for airway protection after witnessed seizure. Was not hypoxic on room air in ED.  - c/w ventilator  - 40/450/12/5    GI  #OG tube  gastric contents observed on bedside POCUS  - OG tube placed to intermittent medium suction      RENAL  #MEERA  Likely pre-renal MEERA.. Unknown baseline Cr. I/s/o severe sepsis, Cr improving w/ treatment of sepsis.  - keena for strict I/Os   - renally dose meds    #metabolic acidosis  pH 7.04, improved after pushes of bicarb in ED. Repeat ABG 7.3  - repeat ABG  - d/c bicarb ggt    HEME  #normocytic anemia  Likely acute on chronic.   P/w Hgb 2.2 and hypoproliferative retic index. No signs of bleeding. No known malignancies.  Unknown if has CKD but if so could contribute to AOCD.  MCV normocytic so unlikely to have thalassemia.  s/p 7u pRBC. normal hemolysis labs, Hgb now 7.7    - CT A/P when stable  - active T&S  - transfuse for hgb <7  - hold off on FFP, pending repeat coags    SKIN  #bird mites   came to [facility] and identified bugs as "bird mites"  - was decontaminated at [facility]  - bugs seen on patient and sheets at [facility] ICU, and within vEEG dressing  - start permethrin wash  - remove vEEG, apply permethrin wash, restart vEEG    ENDO  - q4h fingersticks  - f/u TSH    ID  #severe sepsis  Hypothermic with leukocytosis, on ursula hugger s/p right femoral warming catheter  Hypothermia likely 2/2 environment and infection.  CXR with left LL consolidation, confirmed on bedside POCUS  - c/w vanc/zosyn  - f/u BCx  - f/u UCx and SCx  - f/u MRSA swab      F: s/p 4L NS and 7u prbc  E: replenish as appropriate  N: NPO    VTE Prophylaxis: SCD's; no chemical ppx in setting of severe anemia  GI: PPI BID  C: Full Code  D: MICU   54yo M with unknown PMH, undomiciled, presented to [facility] by EMS after being found on the ground. Arrived to ED with AMS, severe anemia, metabolic acidosis. Intubated for airway protection at [facility]. Admitted to MICU for further management.      NEURO  #sedated  RASS goal -3 to -4. Yesterday, had planned to wean for extubation, however when off sedation, patient w/ left eye deviation (also seen at [facility] when he had a witnessed seizure). Placed back on vEEG for further monitoring, with plan for extubation on 3/8  - wean precedex  - SAT/SBT on 3/8 PM    #seizure  No known past seizure hx but with witnessed seizure at [facility] w/ left eye deviation during seizure. S/p Keppra loading dose  CT head wnl. Utox neg.   - vEEG reports 3/8: Moderate generalized slowing suggestive of a moderate degree of diffuse or multifocal dysfunction. Some improvement in background slowing from previous day.   - Epilepsy consulted, f/u recs  - ativan 2mg prn for seizure activity >2min    #metabolic encephalopathy  AMS upon arrival to [facility] prior to intubation, unknown baseline mental status, s/p witnessed seizure. Likely 2/2 severe sepsis and anemia.  - severe sepsis and anemia treatment as per below  - c/w thiamine and folic acid    #ataxic gait  Ataxic as per ED. CT head wnl.  - f/u B12, folate  - PT once patient is more stable and extubated    CV  #left bundle branch block  Left bundle of unknown chronicity. Upsloping T waves in V2/V3. Trop I wnl.  No clinical signs of HF, BNP 3100. Likely w/ some component of demand ischemia.  - F/u TTE  - repeat cardiac enzymes    #hypovolemic shock  2/2 to PNA vs poor nutrition vs severe sepsis.   Fluid resuscitation + 7 u pRBC, WWP on exam, temperature increasing on ursula hugger  - severe sepsis and anemia plan as per below     PULM  #intubated  Intubated for airway protection after witnessed seizure. Was not hypoxic on room air in ED.  - c/w ventilator  - 40/450/12/5    GI  #OG tube  gastric contents observed on bedside POCUS  - OG tube placed to intermittent medium suction      RENAL  #MEERA  Cr 2.03 on admission (3/6). Likely pre-renal MEERA.. Unknown baseline Cr. I/s/o severe sepsis, Cr improving w/ treatment of sepsis.  - Cr downtrending to 1.26 (3/8)  - brink for strict I/Os   - renally dose meds  - continue to trend creatinine    #metabolic acidosis- resolved  pH 7.04 on admission. improved after pushes of bicarb in ED. Repeat ABG 7.3     HEME  #Normocytic anemia  Likely acute on chronic.   P/w Hgb 2.2 and hypoproliferative retic index. No signs of bleeding. No known malignancies. Unknown if has CKD but if so could contribute to AOCD. MCV normocytic so unlikely to have thalassemia. S/p 7u pRBC. normal hemolysis labs. Highest on differential for anemia is bird mites biting and sucking on blood.  - Hgb trend 7.7 -> 7.8 -> 7.6 (3/8)  - CT A/P when stable  - active T&S  - transfuse for hgb <7    SKIN  #bird mites   came to [facility] and identified bugs as "bird mites". He was decontaminated at [facility]. Bugs seen on patient and sheets at [facility] ICU (3/7) , and within vEEG dressing.  - s/p permethrin wash and shaving   - c/w permethrin wash as needed    ENDO  - q4h fingersticks  - TSH 2.64    ID  #Severe sepsis  Hypothermic with leukocytosis, on ursula hugger s/p right femoral warming catheter. Hypothermia likely 2/2 environment and infection. CXR with left LL consolidation, confirmed on bedside POCUS  - c/w Vanc 1g Q24Hrs, f/u vanc trough  - c/w Zosyn, today de-escalated to non-pseudomonal dosing: Zosyn 3.375mg Q8Hrs (3/6- )   - MRSA swab w/ +MSSA  - f/u BCx  - f/u UCx and SCx  - f/u sputum culture     Preventive Measures:   F: s/p 4L NS and 7u prbc  E: replenish as appropriate  N: NPO  VTE Prophylaxis: SCD's; no chemical ppx in setting of severe anemia  GI: PPI BID  C: Full Code  D: MICU   52yo M with unknown PMH, undomiciled, presented to [facility] by EMS after being found on the ground. Arrived to ED with AMS, severe anemia, metabolic acidosis. Intubated for airway protection at [facility]. Admitted to MICU for further management.      NEURO  #sedated  RASS goal -3 to -4. Yesterday, had planned to wean for extubation, however when off sedation, patient w/ left eye deviation (also seen at [facility] when he had a witnessed seizure). Placed back on vEEG for further monitoring, with plan for extubation on 3/8  - wean precedex  - SAT/SBT on 3/8 PM    #Seizure  No known past seizure hx but with witnessed seizure at [facility] w/ left eye deviation during seizure. S/p Keppra loading dose  CT head wnl. Utox neg.   - vEEG reports 3/8: Moderate generalized slowing suggestive of a moderate degree of diffuse or multifocal dysfunction. Some improvement in background slowing from previous day.   - Epilepsy consulted, f/u recs  - ativan 2mg prn for seizure activity >2min    #Metabolic encephalopathy  AMS upon arrival to [facility] prior to intubation, unknown baseline mental status, s/p witnessed seizure. Likely 2/2 severe sepsis and anemia.  - severe sepsis and anemia treatment as per below  - c/w thiamine and folic acid    #Ataxic gait  Ataxic as per ED. CT head wnl.  - f/u B12, folate  - PT once patient is more stable and extubated    CV  #left bundle branch block  Left bundle of unknown chronicity. Upsloping T waves in V2/V3. Trop I wnl.  No clinical signs of HF, BNP 3100. Likely w/ some component of demand ischemia.  - F/u TTE  - Trop's plateaued    #Hypovolemic shock  2/2 to PNA vs poor nutrition vs severe sepsis.   Fluid resuscitation + 7 u pRBC, WWP on exam, temperature increasing on ursula hugger  - severe sepsis and anemia plan as per below     PULM  #intubated  Intubated for airway protection after witnessed seizure. Was not hypoxic on room air in ED.  - c/w ventilator  - 40/450/12/5  - SBT/SAT today 3/8    GI  #OG tube  gastric contents observed on bedside POCUS  - OG tube placed to intermittent medium suction      RENAL  #MEERA  Cr 2.03 on admission (3/6). Likely pre-renal MEERA.. Unknown baseline Cr. I/s/o severe sepsis, Cr improving w/ treatment of sepsis.  - Cr downtrending to 1.26 (3/8)  - brink for strict I/Os   - renally dose meds  - continue to trend creatinine    #metabolic acidosis- resolved  pH 7.04 on admission. Improved after pushes of bicarb in ED. Repeat ABG 7.3     HEME  #Normocytic anemia  Likely acute on chronic.   P/w Hgb 2.2 and hypoproliferative retic index. No signs of bleeding. No known malignancies. Unknown if has CKD but if so could contribute to AOCD. MCV normocytic so unlikely to have thalassemia. S/p 7u pRBC. normal hemolysis labs. Highest on differential for anemia is bird mites biting and sucking on blood.  - Hgb trend 7.7 -> 7.8 -> 7.6 (3/8)  - CT A/P when stable  - active T&S  - transfuse for hgb <7    SKIN  #bird mites   came to [facility] and identified bugs as "bird mites". He was decontaminated at [facility]. Bugs seen on patient and sheets at [facility] ICU (3/7) , and within vEEG dressing.  - s/p permethrin wash and shaving   - c/w permethrin wash as needed    ENDO  - q4h fingersticks  - TSH 2.64    ID  #Severe sepsis  Hypothermic with leukocytosis, on ursula hugger s/p right femoral warming catheter. Hypothermia likely 2/2 environment and infection. CXR with left LL consolidation, confirmed on bedside POCUS  - c/w Vanc 1g Q24Hrs, f/u vanc trough  - c/w Zosyn, today de-escalated to non-pseudomonal dosing: Zosyn 3.375mg Q8Hrs (3/6- )   - MRSA swab w/ +MSSA  - f/u BCx  - f/u UCx and SCx  - f/u sputum culture     Preventive Measures:   F: s/p 4L NS and 7u prbc  E: replenish as appropriate  N: NPO  VTE Prophylaxis: SCD's; no chemical ppx in setting of severe anemia  GI: PPI BID  C: Full Code  D: MICU   52yo M with unknown PMH, undomiciled, presented to [facility] by EMS after being found on the ground. Arrived to ED with AMS, severe anemia, metabolic acidosis. Intubated for airway protection at [facility]. Admitted to MICU for further management.      NEURO  #sedated  RASS goal -3 to -4. Yesterday, had planned to wean for extubation, however when off sedation, patient w/ left eye deviation (also seen at [facility] when he had a witnessed seizure). Placed back on vEEG for further monitoring, with plan for extubation on 3/8  - wean precedex  - SAT/SBT on 3/8 PM    #Seizure  No known past seizure hx but with witnessed seizure at [facility] w/ left eye deviation during seizure. S/p Keppra loading dose  CT head wnl. Utox neg.   - vEEG reports 3/8: Moderate generalized slowing suggestive of a moderate degree of diffuse or multifocal dysfunction. Some improvement in background slowing from previous day.   - Epilepsy consulted, f/u recs  - ativan 2mg prn for seizure activity >2min    #Metabolic encephalopathy  AMS upon arrival to [facility] prior to intubation, unknown baseline mental status, s/p witnessed seizure. Likely 2/2 severe sepsis and anemia.  - severe sepsis and anemia treatment as per below  - c/w thiamine and folic acid    #Ataxic gait  Ataxic as per ED. CT head wnl.  - f/u B12, folate  - PT once patient is more stable and extubated    CV  #left bundle branch block  Left bundle of unknown chronicity. Upsloping T waves in V2/V3. Trop I wnl.  No clinical signs of HF, BNP 3100. Likely w/ some component of demand ischemia.  - F/u TTE  - Trop's plateaued    #Hypovolemic shock  2/2 to PNA vs poor nutrition vs severe sepsis.   Fluid resuscitation + 7 u pRBC, WWP on exam, temperature increasing on ursula hugger  - severe sepsis and anemia plan as per below     PULM  #intubated  Intubated for airway protection after witnessed seizure. Was not hypoxic on room air in ED.  - c/w ventilator  - SBT/SAT today 3/8    GI  #OG tube  gastric contents observed on bedside POCUS  - OG tube placed to intermittent medium suction      RENAL  #MEERA  Cr 2.03 on admission (3/6). Likely pre-renal MEERA.. Unknown baseline Cr. I/s/o severe sepsis, Cr improving w/ treatment of sepsis.  - Cr downtrending to 1.26 (3/8)  - brink for strict I/Os   - renally dose meds  - continue to trend creatinine    #metabolic acidosis- resolved  pH 7.04 on admission. Improved after pushes of bicarb in ED. Repeat ABG 7.3     HEME  #Normocytic anemia  Likely acute on chronic.   P/w Hgb 2.2 and hypoproliferative retic index. No signs of bleeding. No known malignancies. Unknown if has CKD but if so could contribute to AOCD. MCV normocytic so unlikely to have thalassemia. S/p 7u pRBC. normal hemolysis labs. Highest on differential for anemia is bird mites biting and sucking on blood.  - Hgb trend 7.7 -> 7.8 -> 7.6 (3/8)  - active T&S  - transfuse for hgb <7    SKIN  #bird mites   came to [facility] and identified bugs as "bird mites". He was decontaminated at [facility]. Bugs seen on patient and sheets at [facility] ICU (3/7) , and within vEEG dressing.  - s/p permethrin wash and shaving   - c/w permethrin wash as needed    ENDO  - q4h fingersticks  - TSH 2.64    ID  #Severe sepsis  Hypothermic with leukocytosis, on ursula hugger s/p right femoral warming catheter. Hypothermia likely 2/2 environment and infection. CXR with left LL consolidation, confirmed on bedside POCUS  - c/w Vanc 1g Q24Hrs, f/u vanc trough  - c/w Zosyn, today de-escalated to non-pseudomonal dosing: Zosyn 3.375mg Q8Hrs (3/6- )   - MRSA swab w/ +MSSA  - f/u BCx  - f/u UCx and SCx  - f/u sputum culture     Preventive Measures:   F: s/p 4L NS and 7u prbc  E: replenish as appropriate  N: NPO  VTE Prophylaxis: Heparin 5000 TID  GI: PPI BID  C: Full Code  D: MICU   54yo M with unknown PMH, undomiciled, presented to [facility] by EMS after being found on the ground. Arrived to ED with AMS, severe anemia, metabolic acidosis. Intubated for airway protection at [facility]. Admitted to MICU for further management.      NEURO  #sedated  RASS goal -3 to -4. Yesterday, had planned to wean for extubation, however when off sedation, patient w/ left eye deviation (also seen at [facility] when he had a witnessed seizure). Placed back on vEEG for further monitoring, with plan for extubation on 3/8  - wean precedex  - SAT/SBT on 3/8 PM    #Seizure  No known past seizure hx but with witnessed seizure at [facility] w/ left eye deviation during seizure. S/p Keppra loading dose  CT head wnl. Utox neg.   - vEEG reports 3/8: Moderate generalized slowing suggestive of a moderate degree of diffuse or multifocal dysfunction. Some improvement in background slowing from previous day.   - ativan 2mg prn for seizure activity >2min    #Metabolic encephalopathy  AMS upon arrival to [facility] prior to intubation, unknown baseline mental status, s/p witnessed seizure. Likely 2/2 severe sepsis and anemia.  - severe sepsis and anemia treatment as per below  - c/w thiamine and folic acid    #Ataxic gait  Ataxic as per ED. CT head wnl.  - f/u B12, folate  - PT once patient is more stable and extubated    CV  #left bundle branch block  Left bundle of unknown chronicity. Upsloping T waves in V2/V3. Trop I wnl.  No clinical signs of HF, BNP 3100. Likely w/ some component of demand ischemia.  - F/u TTE  - Trop's plateaued    #Hypovolemic shock  2/2 to PNA vs poor nutrition vs severe sepsis.   Fluid resuscitation + 7 u pRBC, WWP on exam, temperature increasing on ursula hugger  - severe sepsis and anemia plan as per below     PULM  #intubated  Intubated for airway protection after witnessed seizure. Was not hypoxic on room air in ED.  - c/w ventilator  - SBT/SAT today 3/8    GI  #OG tube  gastric contents observed on bedside POCUS  - OG tube placed to intermittent medium suction      RENAL  #MEERA  Cr 2.03 on admission (3/6). Likely pre-renal MEERA.. Unknown baseline Cr. I/s/o severe sepsis, Cr improving w/ treatment of sepsis.  - Cr downtrending to 1.26 (3/8)  - brink for strict I/Os   - renally dose meds  - continue to trend creatinine    #metabolic acidosis- resolved  pH 7.04 on admission. Improved after pushes of bicarb in ED. Repeat ABG 7.3     HEME  #Normocytic anemia  Likely acute on chronic.   P/w Hgb 2.2 and hypoproliferative retic index. No signs of bleeding. No known malignancies. Unknown if has CKD but if so could contribute to AOCD. MCV normocytic so unlikely to have thalassemia. S/p 7u pRBC. normal hemolysis labs. Highest on differential for anemia is bird mites biting and sucking on blood.  - Hgb trend 7.7 -> 7.8 -> 7.6 (3/8)  - active T&S  - transfuse for hgb <7    SKIN  #bird mites   came to [facility] and identified bugs as "bird mites". He was decontaminated at [facility]. Bugs seen on patient and sheets at [facility] ICU (3/7) , and within vEEG dressing.  - s/p permethrin wash and shaving   - c/w permethrin wash as needed    ENDO  - q4h fingersticks  - TSH 2.64    ID  #Severe sepsis  Hypothermic with leukocytosis, on ursula hugger s/p right femoral warming catheter. Hypothermia likely 2/2 environment and infection. CXR with left LL consolidation, confirmed on bedside POCUS  - c/w Vanc 1g Q24Hrs, f/u vanc trough  - c/w Zosyn, today de-escalated to non-pseudomonal dosing: Zosyn 3.375mg Q8Hrs (3/6- )   - MRSA swab w/ +MSSA  - f/u BCx  - f/u UCx and SCx  - f/u sputum culture     Preventive Measures:   F: s/p 4L NS and 7u prbc  E: replenish as appropriate  N: NPO  VTE Prophylaxis: Heparin 5000 TID  GI: PPI BID  C: Full Code  D: MICU

## 2023-03-08 NOTE — DIETITIAN INITIAL EVALUATION ADULT - PERTINENT LABORATORY DATA
03-08    143  |  109<H>  |  23  ----------------------------<  101<H>  4.0   |  27  |  1.26    Ca    7.8<L>      08 Mar 2023 06:06  Phos  3.8     03-08  Mg     2.3     03-08    TPro  5.6<L>  /  Alb  2.5<L>  /  TBili  0.4  /  DBili  x   /  AST  37  /  ALT  39  /  AlkPhos  49  03-08  POCT Blood Glucose.: 102 mg/dL (03-08-23 @ 11:09)  A1C with Estimated Average Glucose Result: 5.5 % (03-07-23 @ 06:18)

## 2023-03-08 NOTE — DIETITIAN INITIAL EVALUATION ADULT - [id number]_GEN_A_CORE
Pressure Injury 1: Right:,buttocks, Stage IV  Pressure Injury 2: sacrum, Stage II  Pressure Injury 3: none, none  Pressure Injury 4: none, none  Pressure Injury 5: none, none  Pressure Injury 6: none, none  Pressure Injury 7: none, none  Pressure Injury 8: none, none  Pressure Injury 9: none, none  Pressure Injury 10: none, none  Pressure Injury 11: none, none

## 2023-03-08 NOTE — EEG REPORT - NS EEG TEXT BOX
[facility] Department of Neurology  Inpatient Continuous video-Electroencephalogram    Patient Name:	DALTON JUNG    :	1970  MRN:	[id number]    Study Start Date/Time:  3/7/2023, 1:57:11 AM  Study End Date/Time:    Referred by: select    Brief Clinical History:  DALTON JUNG is a 53 year old Male; study performed to investigate for seizures or markers of epilepsy.    Diagnosis Code:   R56.9 convulsions/seizure  The live video was: unmonitored.    Pertinent Medications:  n/a    Acquisition Details:  Electroencephalography was acquired using a minimum of 21 channels on an "[employer] Neurology system v 9.3.1 with electrode placement according to the standard International 10-20 system following ACNS (American Clinical Neurophysiology Society) guidelines for Long-Term Video EEG monitoring.  Anterior temporal T1 and T2 electrodes were utilized whenever possible.   The [employer] automated spike & seizure detections were all reviewed in detail, in addition to extensive portions of raw EEG.      Day 1: 3/7/2023 @ 1:57:11 AM to morning @ 07:00 am  Background:  nearly continuous (<10% periods of attenuation), with predominantly theta>delta frequencies.  Symmetry:  No persistent asymmetries of voltage or frequency.  Posterior Dominant Rhythm:  No clear PDR   Organization: Absent.  Voltage:  Normal (20+ uV)  Variability: Yes. 		Reactivity: Yes.  N2 sleep: Absent.  Spontaneous Activity:  No epileptiform discharges.  Periodic/rhythmic activity:  None  Events:  No electrographic seizures or significant clinical events.  Provocations:  Hyperventilation and Photic stimulation: was not performed.    Daily Summary:    Continuous Moderate generalized   slowing suggestive of a Moderate degree of diffuse or multifocal  dysfunction.      Caitlin Francisco MD  Attending Neurologist, [facility]      Daily Updates (from 07:00 am until 07:00 am):  Day 2 3/7-3/8/2023:   Background:  continuous, with predominantly theta>delta frequencies, improved to alpha/theta over the courseo f the recording.  Symmetry:  No persistent asymmetries of voltage or frequency.  Posterior Dominant Rhythm: initially  No clear PDR, reaches up to 8 hz  Organization: Absent.  Voltage:  Normal (20+ uV)  Variability: Yes. 		Reactivity: Yes.  N2 sleep: Symmetric spindles.  Spontaneous Activity:  No epileptiform discharges.  Periodic/rhythmic activity:  None  Events:  No electrographic seizures or significant clinical events.  Provocations:  Hyperventilation and Photic stimulation: was not performed.    Daily Summary:    Moderate generalized   slowing suggestive of a Moderate degree of diffuse or multifocal  dysfunction. Some improvement in background slowing  from previous day      Caitlin Francisco MD  Attending Neurologist, [facility]

## 2023-03-08 NOTE — DIETITIAN INITIAL EVALUATION ADULT - PERTINENT MEDS FT
MEDICATIONS  (STANDING):  chlorhexidine 0.12% Liquid 15 milliLiter(s) Oral Mucosa every 12 hours  chlorhexidine 2% Cloths 1 Application(s) Topical <User Schedule>  dexMEDEtomidine Infusion 0.4 MICROgram(s)/kG/Hr (7 mL/Hr) IV Continuous <Continuous>  dextrose 5%. 1000 milliLiter(s) (100 mL/Hr) IV Continuous <Continuous>  dextrose 5%. 1000 milliLiter(s) (50 mL/Hr) IV Continuous <Continuous>  dextrose 50% Injectable 25 Gram(s) IV Push once  dextrose 50% Injectable 12.5 Gram(s) IV Push once  dextrose 50% Injectable 25 Gram(s) IV Push once  folic acid Injectable 1 milliGRAM(s) IV Push daily  glucagon  Injectable 1 milliGRAM(s) IntraMuscular once  heparin   Injectable 5000 Unit(s) SubCutaneous every 8 hours  insulin lispro (ADMELOG) corrective regimen sliding scale   SubCutaneous every 6 hours  pantoprazole  Injectable 40 milliGRAM(s) IV Push two times a day  piperacillin/tazobactam IVPB.. 3.375 Gram(s) IV Intermittent every 8 hours  thiamine IVPB 500 milliGRAM(s) IV Intermittent every 8 hours    MEDICATIONS  (PRN):  dextrose Oral Gel 15 Gram(s) Oral once PRN Blood Glucose LESS THAN 70 milliGRAM(s)/deciliter

## 2023-03-08 NOTE — DIETITIAN INITIAL EVALUATION ADULT - OTHER INFO
54yo M with unknown PMH, undomiciled, presented to [facility] by EMS after being found on the ground. Arrived to ED with AMS, severe anemia, metabolic acidosis. Intubated for airway protection at [facility]. Continue management at [facility] MICU.    Pt care discussed in IDT rounds. Rx and labs reviewed. Pt intubated and sedated at time of assessment; vent to VC-AC, MAP 73, no pressors, no propofol running at time of assessment. No ht value in EMR; unable to gather estimated ht d/t pt condition and equipment. Gather full NFPE and nutrition hx as able at f/u. No plans for nutrition at this time. Spoke with MD who notes plan to extubate today; monitor for SBT and dysphagia screen p/t PO intake. No other reports GI distress or further nutritional concerns at this time. RDN will continue to reassess, intervene, and monitor as appropriate.     Pain: No non-verbal indicators present   GI: Abdomen rounded, +BS x4, LBM PTA   Skin: PI stg IV R buttocks, PI stg II sacrum, +1 gen. edema

## 2023-03-08 NOTE — DIETITIAN INITIAL EVALUATION ADULT - NUTRITIONGOAL OUTCOME1
- Initiate nutrition within 24-48 hrs   - Pt will meet 75% or more of protein/energy needs via most appropriate route for nutrition

## 2023-03-09 LAB
ALBUMIN SERPL ELPH-MCNC: 2.5 G/DL — LOW (ref 3.3–5)
ALP SERPL-CCNC: 53 U/L — SIGNIFICANT CHANGE UP (ref 40–120)
ALT FLD-CCNC: 35 U/L — SIGNIFICANT CHANGE UP (ref 10–45)
ANION GAP SERPL CALC-SCNC: 6 MMOL/L — SIGNIFICANT CHANGE UP (ref 5–17)
ANISOCYTOSIS BLD QL: SLIGHT — SIGNIFICANT CHANGE UP
AST SERPL-CCNC: 34 U/L — SIGNIFICANT CHANGE UP (ref 10–40)
BASOPHILS # BLD AUTO: 0 K/UL — SIGNIFICANT CHANGE UP (ref 0–0.2)
BASOPHILS NFR BLD AUTO: 0 % — SIGNIFICANT CHANGE UP (ref 0–2)
BILIRUB SERPL-MCNC: 0.4 MG/DL — SIGNIFICANT CHANGE UP (ref 0.2–1.2)
BUN SERPL-MCNC: 25 MG/DL — HIGH (ref 7–23)
BURR CELLS BLD QL SMEAR: PRESENT — SIGNIFICANT CHANGE UP
CALCIUM SERPL-MCNC: 8.2 MG/DL — LOW (ref 8.4–10.5)
CHLORIDE SERPL-SCNC: 110 MMOL/L — HIGH (ref 96–108)
CO2 SERPL-SCNC: 26 MMOL/L — SIGNIFICANT CHANGE UP (ref 22–31)
CREAT SERPL-MCNC: 1.12 MG/DL — SIGNIFICANT CHANGE UP (ref 0.5–1.3)
EGFR: 79 ML/MIN/1.73M2 — SIGNIFICANT CHANGE UP
EOSINOPHIL # BLD AUTO: 0.46 K/UL — SIGNIFICANT CHANGE UP (ref 0–0.5)
EOSINOPHIL NFR BLD AUTO: 2.6 % — SIGNIFICANT CHANGE UP (ref 0–6)
FOLATE SERPL-MCNC: 10.9 NG/ML — SIGNIFICANT CHANGE UP
GIANT PLATELETS BLD QL SMEAR: PRESENT — SIGNIFICANT CHANGE UP
GLUCOSE BLDC GLUCOMTR-MCNC: 103 MG/DL — HIGH (ref 70–99)
GLUCOSE BLDC GLUCOMTR-MCNC: 91 MG/DL — SIGNIFICANT CHANGE UP (ref 70–99)
GLUCOSE BLDC GLUCOMTR-MCNC: 94 MG/DL — SIGNIFICANT CHANGE UP (ref 70–99)
GLUCOSE SERPL-MCNC: 106 MG/DL — HIGH (ref 70–99)
HCT VFR BLD CALC: 23.7 % — LOW (ref 39–50)
HGB BLD-MCNC: 7.6 G/DL — LOW (ref 13–17)
LYMPHOCYTES # BLD AUTO: 0.46 K/UL — LOW (ref 1–3.3)
LYMPHOCYTES # BLD AUTO: 2.6 % — LOW (ref 13–44)
MAGNESIUM SERPL-MCNC: 2.4 MG/DL — SIGNIFICANT CHANGE UP (ref 1.6–2.6)
MANUAL SMEAR VERIFICATION: SIGNIFICANT CHANGE UP
MCHC RBC-ENTMCNC: 26 PG — LOW (ref 27–34)
MCHC RBC-ENTMCNC: 32.1 GM/DL — SIGNIFICANT CHANGE UP (ref 32–36)
MCV RBC AUTO: 81.2 FL — SIGNIFICANT CHANGE UP (ref 80–100)
MICROCYTES BLD QL: SLIGHT — SIGNIFICANT CHANGE UP
MONOCYTES # BLD AUTO: 0 K/UL — SIGNIFICANT CHANGE UP (ref 0–0.9)
MONOCYTES NFR BLD AUTO: 0 % — LOW (ref 2–14)
NEUTROPHILS # BLD AUTO: 16.74 K/UL — HIGH (ref 1.8–7.4)
NEUTROPHILS NFR BLD AUTO: 94.8 % — HIGH (ref 43–77)
NRBC # BLD: 1 /100 — HIGH (ref 0–0)
NRBC # BLD: SIGNIFICANT CHANGE UP /100 WBCS (ref 0–0)
OVALOCYTES BLD QL SMEAR: SLIGHT — SIGNIFICANT CHANGE UP
PHOSPHATE SERPL-MCNC: 3.2 MG/DL — SIGNIFICANT CHANGE UP (ref 2.5–4.5)
PLAT MORPH BLD: ABNORMAL
PLATELET # BLD AUTO: 202 K/UL — SIGNIFICANT CHANGE UP (ref 150–400)
POIKILOCYTOSIS BLD QL AUTO: SLIGHT — SIGNIFICANT CHANGE UP
POLYCHROMASIA BLD QL SMEAR: SLIGHT — SIGNIFICANT CHANGE UP
POTASSIUM SERPL-MCNC: 4 MMOL/L — SIGNIFICANT CHANGE UP (ref 3.5–5.3)
POTASSIUM SERPL-SCNC: 4 MMOL/L — SIGNIFICANT CHANGE UP (ref 3.5–5.3)
PROT SERPL-MCNC: 5.9 G/DL — LOW (ref 6–8.3)
RBC # BLD: 2.92 M/UL — LOW (ref 4.2–5.8)
RBC # FLD: 23.9 % — HIGH (ref 10.3–14.5)
RBC BLD AUTO: ABNORMAL
SCHISTOCYTES BLD QL AUTO: SLIGHT — SIGNIFICANT CHANGE UP
SODIUM SERPL-SCNC: 142 MMOL/L — SIGNIFICANT CHANGE UP (ref 135–145)
VIT B12 SERPL-MCNC: 1098 PG/ML — SIGNIFICANT CHANGE UP (ref 232–1245)
WBC # BLD: 17.66 K/UL — HIGH (ref 3.8–10.5)
WBC # FLD AUTO: 17.66 K/UL — HIGH (ref 3.8–10.5)

## 2023-03-09 PROCEDURE — 74176 CT ABD & PELVIS W/O CONTRAST: CPT | Mod: 26

## 2023-03-09 PROCEDURE — 99221 1ST HOSP IP/OBS SF/LOW 40: CPT

## 2023-03-09 PROCEDURE — 36000 PLACE NEEDLE IN VEIN: CPT

## 2023-03-09 PROCEDURE — 99291 CRITICAL CARE FIRST HOUR: CPT | Mod: GC

## 2023-03-09 RX ORDER — VALPROIC ACID (AS SODIUM SALT) 250 MG/5ML
250 SOLUTION, ORAL ORAL EVERY 12 HOURS
Refills: 0 | Status: DISCONTINUED | OUTPATIENT
Start: 2023-03-09 | End: 2023-03-10

## 2023-03-09 RX ORDER — PIPERACILLIN AND TAZOBACTAM 4; .5 G/20ML; G/20ML
4.5 INJECTION, POWDER, LYOPHILIZED, FOR SOLUTION INTRAVENOUS EVERY 8 HOURS
Refills: 0 | Status: DISCONTINUED | OUTPATIENT
Start: 2023-03-09 | End: 2023-03-10

## 2023-03-09 RX ORDER — HALOPERIDOL DECANOATE 100 MG/ML
2.5 INJECTION INTRAMUSCULAR EVERY 4 HOURS
Refills: 0 | Status: DISCONTINUED | OUTPATIENT
Start: 2023-03-09 | End: 2023-03-09

## 2023-03-09 RX ORDER — MIDAZOLAM HYDROCHLORIDE 1 MG/ML
1 INJECTION, SOLUTION INTRAMUSCULAR; INTRAVENOUS ONCE
Refills: 0 | Status: DISCONTINUED | OUTPATIENT
Start: 2023-03-09 | End: 2023-03-09

## 2023-03-09 RX ORDER — HALOPERIDOL DECANOATE 100 MG/ML
2.5 INJECTION INTRAMUSCULAR
Refills: 0 | Status: DISCONTINUED | OUTPATIENT
Start: 2023-03-09 | End: 2023-03-10

## 2023-03-09 RX ORDER — HALOPERIDOL DECANOATE 100 MG/ML
2.5 INJECTION INTRAMUSCULAR ONCE
Refills: 0 | Status: COMPLETED | OUTPATIENT
Start: 2023-03-09 | End: 2023-03-09

## 2023-03-09 RX ORDER — OLANZAPINE 15 MG/1
5 TABLET, FILM COATED ORAL ONCE
Refills: 0 | Status: COMPLETED | OUTPATIENT
Start: 2023-03-09 | End: 2023-03-09

## 2023-03-09 RX ORDER — HALOPERIDOL DECANOATE 100 MG/ML
5 INJECTION INTRAMUSCULAR ONCE
Refills: 0 | Status: COMPLETED | OUTPATIENT
Start: 2023-03-09 | End: 2023-03-09

## 2023-03-09 RX ORDER — OLANZAPINE 15 MG/1
10 TABLET, FILM COATED ORAL DAILY
Refills: 0 | Status: DISCONTINUED | OUTPATIENT
Start: 2023-03-09 | End: 2023-03-09

## 2023-03-09 RX ADMIN — PIPERACILLIN AND TAZOBACTAM 25 GRAM(S): 4; .5 INJECTION, POWDER, LYOPHILIZED, FOR SOLUTION INTRAVENOUS at 19:50

## 2023-03-09 RX ADMIN — Medication 1 MILLIGRAM(S): at 11:30

## 2023-03-09 RX ADMIN — DEXMEDETOMIDINE HYDROCHLORIDE IN 0.9% SODIUM CHLORIDE 7 MICROGRAM(S)/KG/HR: 4 INJECTION INTRAVENOUS at 18:00

## 2023-03-09 RX ADMIN — HALOPERIDOL DECANOATE 5 MILLIGRAM(S): 100 INJECTION INTRAMUSCULAR at 09:09

## 2023-03-09 RX ADMIN — MIDAZOLAM HYDROCHLORIDE 1 MILLIGRAM(S): 1 INJECTION, SOLUTION INTRAMUSCULAR; INTRAVENOUS at 02:48

## 2023-03-09 RX ADMIN — HALOPERIDOL DECANOATE 2.5 MILLIGRAM(S): 100 INJECTION INTRAMUSCULAR at 13:46

## 2023-03-09 RX ADMIN — PANTOPRAZOLE SODIUM 40 MILLIGRAM(S): 20 TABLET, DELAYED RELEASE ORAL at 05:10

## 2023-03-09 RX ADMIN — HALOPERIDOL DECANOATE 2.5 MILLIGRAM(S): 100 INJECTION INTRAMUSCULAR at 21:21

## 2023-03-09 RX ADMIN — HALOPERIDOL DECANOATE 2.5 MILLIGRAM(S): 100 INJECTION INTRAMUSCULAR at 18:07

## 2023-03-09 RX ADMIN — HALOPERIDOL DECANOATE 2.5 MILLIGRAM(S): 100 INJECTION INTRAMUSCULAR at 12:31

## 2023-03-09 RX ADMIN — DEXMEDETOMIDINE HYDROCHLORIDE IN 0.9% SODIUM CHLORIDE 7 MICROGRAM(S)/KG/HR: 4 INJECTION INTRAVENOUS at 09:33

## 2023-03-09 RX ADMIN — PIPERACILLIN AND TAZOBACTAM 25 GRAM(S): 4; .5 INJECTION, POWDER, LYOPHILIZED, FOR SOLUTION INTRAVENOUS at 03:50

## 2023-03-09 RX ADMIN — OLANZAPINE 5 MILLIGRAM(S): 15 TABLET, FILM COATED ORAL at 19:15

## 2023-03-09 RX ADMIN — PIPERACILLIN AND TAZOBACTAM 25 GRAM(S): 4; .5 INJECTION, POWDER, LYOPHILIZED, FOR SOLUTION INTRAVENOUS at 11:30

## 2023-03-09 RX ADMIN — HEPARIN SODIUM 5000 UNIT(S): 5000 INJECTION INTRAVENOUS; SUBCUTANEOUS at 07:54

## 2023-03-09 RX ADMIN — HEPARIN SODIUM 5000 UNIT(S): 5000 INJECTION INTRAVENOUS; SUBCUTANEOUS at 16:22

## 2023-03-09 RX ADMIN — DEXMEDETOMIDINE HYDROCHLORIDE IN 0.9% SODIUM CHLORIDE 7 MICROGRAM(S)/KG/HR: 4 INJECTION INTRAVENOUS at 05:32

## 2023-03-09 RX ADMIN — CHLORHEXIDINE GLUCONATE 1 APPLICATION(S): 213 SOLUTION TOPICAL at 05:10

## 2023-03-09 RX ADMIN — DEXMEDETOMIDINE HYDROCHLORIDE IN 0.9% SODIUM CHLORIDE 7 MICROGRAM(S)/KG/HR: 4 INJECTION INTRAVENOUS at 21:21

## 2023-03-09 RX ADMIN — PANTOPRAZOLE SODIUM 40 MILLIGRAM(S): 20 TABLET, DELAYED RELEASE ORAL at 17:03

## 2023-03-09 RX ADMIN — Medication 52.5 MILLIGRAM(S): at 19:47

## 2023-03-09 NOTE — BH CONSULTATION LIAISON ASSESSMENT NOTE - NSBHCHARTREVIEWVS_PSY_A_CORE FT
Vital Signs Last 24 Hrs  T(C): 36.4 (09 Mar 2023 06:04), Max: 36.4 (09 Mar 2023 01:03)  T(F): 97.5 (09 Mar 2023 06:04), Max: 97.6 (09 Mar 2023 01:03)  HR: 64 (09 Mar 2023 09:39) (61 - 95)  BP: 104/74 (09 Mar 2023 09:39) (89/53 - 132/71)  BP(mean): 86 (09 Mar 2023 09:39) (65 - 88)  RR: 16 (09 Mar 2023 08:45) (4 - 22)  SpO2: 100% (09 Mar 2023 09:39) (78% - 100%)    Parameters below as of 09 Mar 2023 08:45  Patient On (Oxygen Delivery Method): room air

## 2023-03-09 NOTE — BH CONSULTATION LIAISON ASSESSMENT NOTE - NSBHMSEEYE_PSY_A_CORE
Health Maintenance Due   Topic Date Due   • Shingles Vaccine (1 of 2) 02/28/1995   • Diabetes Eye Exam  08/13/2016   • Medicare Wellness 65+  08/11/2017   • Breast Cancer Screening  09/26/2018       Patient is up to date, no discussion needed.      Unaddressed Risk Adjusted HCC Categories and Diagnoses  HCC 12 - Breast, Prostate, Other Cancers & Tumors   Unaddressed Dx:Malignant Neoplasm Of Thyroid Gland (Cms/Hcc)  HCC 85 - Congestive Heart Failure   Unaddressed Dx:Pulmonary Hypertension, Unspecified (Cms/Hcc)   - Vascular Disease with Complications   Unaddressed Dx:Venous Stasis Ulcer Of Right Lower Extremity (Cms/Hcc)   - Chronic Ulcer of Skin, Except Pressure Ulcer   Unaddressed Dx:Venous Stasis Ulcer Of Right Lower Extremity (Cms/Hcc)         Poor

## 2023-03-09 NOTE — BH CONSULTATION LIAISON ASSESSMENT NOTE - HPI (INCLUDE ILLNESS QUALITY, SEVERITY, DURATION, TIMING, CONTEXT, MODIFYING FACTORS, ASSOCIATED SIGNS AND SYMPTOMS)
52 yo M w/ unknown PMHx, brought to [facility] from [facility] by EMS after he was found on the ground. While admitted to [facility] ED, pt noted he was homeless and living on the street. Noted to have "bed bugs" as well. Pt was hypothermic to 85F in ED, intubated for airway protection, and also had a witnessed seizure requiring Keppra. Pt also required 2u prbcs, which improved his hgb to 3.3. Since admission to [facility] MICU, pt has been extubated but remains on sedation 2/2 severe agitation. Pt noted to throw food and items around the room and nurses, requiring precedex gtt and Haldol prn agitation.     On evaluation at bedside, pt is somnolent s/p haldol 2/2 agitation this morning. Attempted to interview pt, who was initially unresponsive to verbal stimulus. Responded to physical stimulus with shoulder rub but refused to open eyes when prompted. Pt continued to shake his head no when asked if he could open his eyes and when asked if he could answer my questions. Pt continued to nod his head no and refused to respond when asked his name and when attempting to orient pt. Pt seemed comfortable in bed, negative for signs of tremors or acute distress.  Unable to evaluate pt mental status and AAO.    Pt is on EEG, impressions noting moderate slowing with no evidence of epileptic activity since monitoring was started.  Pt is a 54 yo AA M w/ unknown PMHx, brought to [facility] from [facility] by EMS after he was found on the ground. While admitted to [facility] ED, pt noted he was homeless and living on the street. Noted to have "bed bugs" as well. Pt was hypothermic to 85F in ED, intubated for airway protection, and also had a witnessed seizure requiring Keppra (now discontinued). Pt required 2u prbcs, which improved his hgb to 3.3. Since admission to [facility] MICU, pt has been extubated but remains on sedation 2/2 severe agitation. Pt noted to throw food and items around the room and nurses, requiring Precedex gtt and Haldol prn agitation.     On evaluation at bedside, pt is somnolent s/p haldol 5 mg X 1 2/2 agitation this morning. Attempted to interview pt, who was initially unresponsive to verbal stimulus. Responded to physical stimulus with shoulder rub but refused to open eyes when prompted. Pt continued to shake his head no when asked if he could open his eyes and when asked if he could answer my questions. Pt continued to nod his head no and refused to respond when asked his name and when attempting to orient pt. Pt seemed comfortable in bed, negative for signs of tremors or diaphoresis or acute distress.  Unable to evaluate pt mental status further due to incoherent speech and poor cooperation.     Pt is on EEG, impressions noting moderate slowing with no evidence of epileptic activity since monitoring was started.

## 2023-03-09 NOTE — PROCEDURE NOTE - NSPROCNAME_GEN_A_CORE
Peripheral Line Insertion
Peripheral Line Insertion
Central Line Insertion
Arterial Puncture/Cannulation

## 2023-03-09 NOTE — BH CONSULTATION LIAISON ASSESSMENT NOTE - NSBHCHARTREVIEWLAB_PSY_A_CORE FT
LABS:                          7.6    17.66 )-----------( 202      ( 09 Mar 2023 05:30 )             23.7     03-09    142  |  110<H>  |  25<H>  ----------------------------<  106<H>  4.0   |  26  |  1.12    Ca    8.2<L>      09 Mar 2023 05:30  Phos  3.2     03-09  Mg     2.4     03-09    TPro  5.9<L>  /  Alb  2.5<L>  /  TBili  0.4  /  DBili  x   /  AST  34  /  ALT  35  /  AlkPhos  53  03-09    LIVER FUNCTIONS - ( 09 Mar 2023 05:30 )  Alb: 2.5 g/dL / Pro: 5.9 g/dL / ALK PHOS: 53 U/L / ALT: 35 U/L / AST: 34 U/L / GGT: x

## 2023-03-09 NOTE — PROCEDURE NOTE - NSICDXPROCEDURE_GEN_ALL_CORE_FT
PROCEDURES:  Insertion, arterial line, percutaneous 07-Mar-2023 01:49:00  Han Barnett  Insertion of arterial catheter with ultrasound guidance 07-Mar-2023 01:49:18  Han Barnett  
PROCEDURES:  Insertion of intravenous catheter with ultrasound guidance 08-Mar-2023 12:19:10  Marie Marie  
PROCEDURES:  Imaging guided insertion of central venous cannula 07-Mar-2023 05:15:56  Han Barnett  Insertion, needle, vein 09-Mar-2023 19:47:05  Han Barnett  
PROCEDURES:  Imaging guided insertion of central venous cannula 07-Mar-2023 05:15:56  Han Barnett

## 2023-03-09 NOTE — BH CONSULTATION LIAISON ASSESSMENT NOTE - NSBHCHARTREVIEWINVESTIGATE_PSY_A_CORE FT
< from: CT Abdomen and Pelvis No Cont (03.09.23 @ 03:36) >    IMPRESSION:  1.  No evidence of retroperitoneal or thigh hematoma.  2.  Complete consolidation of the visualized right lower lobe, possibly   aspiration/pneumonia.  3.  Small left pleural effusion and adjacent atelectasis/consolidation.  4.  Small volume simple fluid attenuating ascites.    < end of copied text >    < from: Xray Chest 1 View- PORTABLE-Urgent (Xray Chest 1 View- PORTABLE-Urgent .) (03.07.23 @ 23:28) >    IMPRESSION: Endotracheal tube with tip approximately 3 cm above the   yas. Otherwise, no significant interval change.    < end of copied text >    < from: CT Head No Cont (03.06.23 @ 19:55) >    IMPRESSION:    No acute intracranial pathology      < end of copied text >

## 2023-03-09 NOTE — BH CONSULTATION LIAISON ASSESSMENT NOTE - NSBHATTESTCOMMENTATTENDFT_PSY_A_CORE
Pt is a 54 yo AA M w/ unknown PMHx, brought to [facility] from [facility] by EMS after he was found on the ground. Pt was hypothermic to 85F in ED, intubated, required 2Units of PRBC, had a witnessed seizure requiring Keppra (now discontinued). Since admission to [facility] MICU, pt has been extubated but remained on sedation 2/2 severe agitation, requiring Precedex, Haldol to control agitation.   On current evaluation pt is lethargic, intermittently restless, incoherent, unable to participate in the evaluation due to altered MS.  Clinical presentation is consistent with delirium. However underlying psychiatric disorder can not be ruled out at this time.   See above for medication recommendations.   Provided QTc normalizes may continue to use Haldol 5 mg IV/PO/IM q 6 hrs prn.   If QTc is prolonged, recommend Olanzapine 5 mg po/IM q 4 hrs prn  Avoid Benzo's if possible. Trazodone 50 mg qhs prn insomnia.   Check HIV status   Pt is a 54 yo AA M w/ unknown PMHx, brought to [facility] from [facility] by EMS after he was found on the ground. Pt was hypothermic to 85F in ED, intubated, required 2Units of PRBC, had a witnessed seizure requiring Keppra (now discontinued). Since admission to [facility] MICU, pt has been extubated but remained on sedation 2/2 severe agitation, requiring Precedex, Haldol to control agitation.   On current evaluation pt is lethargic, intermittently restless, incoherent, unable to participate in the evaluation due to altered MS.  Clinical presentation is consistent with delirium. However underlying psychiatric disorder can not be ruled out at this time.   See above for medication recommendations.   Provided QTc normalizes may continue to use Haldol 5 mg IV/PO/IM q 6 hrs prn.   If QTc is prolonged, recommend Olanzapine 5 mg po/IM q 4 hrs prn  Avoid Benzo's if possible. Trazodone 50 mg qhs prn insomnia.

## 2023-03-09 NOTE — PROGRESS NOTE ADULT - ASSESSMENT
54yo M with unknown PMH, undomiciled, presented to [facility] by EMS after being found on the ground. Arrived to ED with AMS, severe anemia, metabolic acidosis. Intubated for airway protection at [facility]. Now s/p extubation on 3/8, admitted to MICU for further management.     NEURO    #Seizure  No known past seizure hx but with witnessed seizure at [facility] w/ left eye deviation during seizure. S/p Keppra loading dose  CT head wnl. Utox neg.   - vEEG reports 3/8: Moderate generalized slowing suggestive of a moderate degree of diffuse or multifocal dysfunction. Some improvement in background slowing from previous day.   - ativan 2mg prn for seizure activity >2min    #Metabolic encephalopathy  AMS upon arrival to [facility] prior to intubation, unknown baseline mental status, s/p witnessed seizure. Likely 2/2 severe sepsis and anemia. Patient now extubated, AAOx1 to time and speaking incoherently.   - psych consult for concern for possible psych hx affecting patient's mental status  - c/w thiamine and folic acid  - sedation/ Precedex being weaned since extubation on 3/8, however was continued this morning due to agitation. Continue to wean as tolerated    #?Psych history  Patient unable to report medical history. Very agitated, incoherent speech. Required Versed 1mg for imaging. S/p IV Haldol 5mg for agitation during day 3/9.   - psych consult, f/u recs  - Haldol as needed for agitation    #Ataxic gait  Ataxic as per ED. CT head wnl.  - f/u B12, folate  - PT once patient is more stable     CV    #Left bundle branch block  Left bundle of unknown chronicity. Upsloping T waves in V2/V3. Trop I wnl.  No clinical signs of HF, BNP 3100. Likely w/ some component of demand ischemia.  - F/u TTE  - Trop's plateaued  - Repeat ekg when patient not agitated    #Hypovolemic shock- Resolved  Likely 2/2 to PNA and bird mites sucking blood causing anemia of hemoglobin of 2.2. Received fluid resuscitation + 7 u pRBC, WWP on exam, required ursula hugger. Now w/ hemoglobin rang of 7-8, not requiring any pressors.       PULM  #Extubated  Pt now extubated (3/8). % on room air, refusing HFNC and NC.     RENAL  #MEERA  Cr 2.03 on admission (3/6). Likely pre-renal MEERA.. Unknown baseline Cr. I/s/o severe sepsis, Cr improving w/ treatment of sepsis.  - Cr continuing to downtrend, 1.26 (3/8) -> 1.12 (3/9)  - brink for strict I/Os   - renally dose meds  - continue to trend creatinine    #metabolic acidosis- resolved  pH 7.04 on admission. Improved after pushes of bicarb in ED. Repeat ABG 7.3     HEME  #Normocytic anemia  Likely acute on chronic. P/w Hgb 2.2 and hypoproliferative retic index on admission. No signs of bleeding. No known malignancies. Unknown if has CKD but if so could contribute to AOCD. MCV normocytic so unlikely to have thalassemia. S/p 7u pRBC. normal hemolysis labs. Highest on differential for anemia is bird mites biting and sucking on blood.  - Hgb trend 7.7 -> 7.8 -> 7.6 (3/8) -> 7.6 (3/9)  - active T&S  - transfuse for hgb <7    SKIN  #Bird mites   came to [facility] and identified bugs as "bird mites". He was decontaminated at [facility]. Bugs seen on patient and sheets at [facility] ICU (3/7) , and within vEEG dressing.  - s/p permethrin wash and shaving   - c/w permethrin wash as needed    ENDO  - q4h fingersticks  - TSH 2.64    ID  #Severe sepsis- resolving  On admission hypothermic with leukocytosis, on ursula hugger and required right femoral warming catheter. Hypothermia likely 2/2 environment and infection. CT A/P w/ complete consolidation of visualized RLL, likely source of sepsis  - c/w Zosyn at pseudomonal dosing 4.5mg Q8Hrs (3/6- )   - s/p Vanc 1g Q24Hrs (3/6-3/7)  - MRSA swab w/ +MSSA  - f/u BCx  - f/u UCx and SCx  - f/u sputum culture     #Pneumonia  CT A/P: Complete consolidation of the visualized right lower lobe, possibly aspiration/pneumonia.  - c/w Zosyn 4.5mg TID (3/6-)       Preventive Measures:   F: s/p 4L NS and 7u prbc  E: replenish as appropriate  N: NPO  VTE Prophylaxis: Heparin 5000 TID  GI: PPI BID  C: Full Code  D: MICU   54yo M with unknown PMH, undomiciled, presented to [facility] by EMS after being found on the ground. Arrived to ED with AMS, severe anemia, metabolic acidosis. Intubated for airway protection at [facility]. Now s/p extubation on 3/8, admitted to MICU for further management.     NEURO    #Sedation  Patient now on sedation of Precedex due to severe agitation this morning.  - continue to wean Precedex as tolerated w/ agitation  - haldol as needed for agitation    #Seizure  No known past seizure hx but with witnessed seizure at [facility] w/ left eye deviation during seizure. S/p Keppra loading dose  CT head wnl. Utox neg.   - vEEG reports 3/8: Moderate generalized slowing suggestive of a moderate degree of diffuse or multifocal dysfunction. Some improvement in background slowing from previous day.   - ativan 2mg prn for seizure activity >2min    #Metabolic encephalopathy  AMS upon arrival to [facility] prior to intubation, unknown baseline mental status, s/p witnessed seizure. Likely 2/2 severe sepsis and anemia. Patient now extubated, AAOx1 to time and speaking incoherently.   - psych consult for concern for possible psych hx affecting patient's mental status  - c/w thiamine and folic acid  - sedation/ Precedex being weaned since extubation on 3/8, however was continued this morning due to agitation. Continue to wean as tolerated    #?Psych history  Patient unable to report medical history. Very agitated, incoherent speech. Required Versed 1mg for imaging. S/p IV Haldol 5mg for agitation during day 3/9.   - psych consult, f/u recs  - Haldol as needed for agitation    #Ataxic gait  Ataxic as per ED. CT head wnl.  - f/u B12, folate  - PT once patient is more stable     CV    #Left bundle branch block  Left bundle of unknown chronicity. Upsloping T waves in V2/V3. Trop I wnl.  No clinical signs of HF, BNP 3100. Likely w/ some component of demand ischemia.  - F/u TTE  - Trop's plateaued  - Repeat ekg when patient not agitated    #Hypovolemic shock- Resolved  Likely 2/2 to PNA and bird mites sucking blood causing anemia of hemoglobin of 2.2. Received fluid resuscitation + 7 u pRBC, WWP on exam, required ursula hugger. Now w/ hemoglobin rang of 7-8, not requiring any pressors.       PULM  #Extubated  Pt now extubated (3/8). % on room air, refusing HFNC and NC.     RENAL  #MEERA  Cr 2.03 on admission (3/6). Likely pre-renal MEERA.. Unknown baseline Cr. I/s/o severe sepsis, Cr improving w/ treatment of sepsis.  - Cr continuing to downtrend, 1.26 (3/8) -> 1.12 (3/9)  - brink for strict I/Os   - renally dose meds  - continue to trend creatinine    #metabolic acidosis- resolved  pH 7.04 on admission. Improved after pushes of bicarb in ED. Repeat ABG 7.3     HEME  #Normocytic anemia  Likely acute on chronic. P/w Hgb 2.2 and hypoproliferative retic index on admission. No signs of bleeding. No known malignancies. Unknown if has CKD but if so could contribute to AOCD. MCV normocytic so unlikely to have thalassemia. S/p 7u pRBC. normal hemolysis labs. Highest on differential for anemia is bird mites biting and sucking on blood.  - Hgb trend 7.7 -> 7.8 -> 7.6 (3/8) -> 7.6 (3/9)  - active T&S  - transfuse for hgb <7    SKIN  #Bird mites   came to [facility] and identified bugs as "bird mites". He was decontaminated at [facility]. Bugs seen on patient and sheets at [facility] ICU (3/7) , and within vEEG dressing.  - s/p permethrin wash and shaving   - c/w permethrin wash as needed    ENDO  - q4h fingersticks  - TSH 2.64    ID  #Severe sepsis- resolving  On admission hypothermic with leukocytosis, on ursula hugger and required right femoral warming catheter. Hypothermia likely 2/2 environment and infection. CT A/P w/ complete consolidation of visualized RLL, likely source of sepsis  - c/w Zosyn at pseudomonal dosing 4.5mg Q8Hrs (3/6- )   - s/p Vanc 1g Q24Hrs (3/6-3/7)  - MRSA swab w/ +MSSA  - f/u BCx  - f/u UCx and SCx  - f/u sputum culture     #Pneumonia  CT A/P: Complete consolidation of the visualized right lower lobe, possibly aspiration/pneumonia.  - c/w Zosyn 4.5mg TID (3/6-)       Preventive Measures:   F: s/p 4L NS and 7u prbc  E: replenish as appropriate  N: NPO  VTE Prophylaxis: Heparin 5000 TID  GI: PPI BID  C: Full Code  D: MICU   52yo M with unknown PMH, undomiciled, presented to [facility] by EMS after being found on the ground. Arrived to ED with AMS, severe anemia, metabolic acidosis. Intubated for airway protection at [facility]. Now s/p extubation on 3/8, admitted to MICU for further management.     NEURO    #Sedation  Patient now on sedation of Precedex due to severe agitation this morning.  - continue to wean Precedex as tolerated w/ agitation  - haldol as needed for agitation    #Seizure  No known past seizure hx but with witnessed seizure at [facility] w/ left eye deviation during seizure. S/p Keppra loading dose  CT head wnl. Utox neg.   - vEEG reports 3/8: Moderate generalized slowing suggestive of a moderate degree of diffuse or multifocal dysfunction. Some improvement in background slowing from previous day.   - ativan 2mg prn for seizure activity >2min    #Metabolic encephalopathy  AMS upon arrival to [facility] prior to intubation, unknown baseline mental status, s/p witnessed seizure. Likely 2/2 severe sepsis and anemia. Patient now extubated, AAOx1 to time and speaking incoherently.   - psych consult for concern for possible psych hx affecting patient's mental status  - c/w thiamine and folic acid  - sedation/ Precedex being weaned since extubation on 3/8, however was continued this morning due to agitation. Continue to wean as tolerated    #?Psych history  Patient unable to report medical history. Very agitated, incoherent speech. Required Versed 1mg for imaging. S/p IV Haldol 5mg for agitation during day 3/9.   - psych consult, f/u recs  - Haldol as needed for agitation    #Ataxic gait  Ataxic as per ED. CT head wnl.  - f/u B12, folate  - PT once patient is more stable     CV    #Left bundle branch block  Left bundle of unknown chronicity. Upsloping T waves in V2/V3. Trop I wnl.  No clinical signs of HF, BNP 3100. Likely w/ some component of demand ischemia.  - F/u TTE  - Trop's plateaued  - Repeat ekg when patient not agitated    #Hypovolemic shock- Resolved  Likely 2/2 to PNA and bird mites sucking blood causing anemia of hemoglobin of 2.2. Received fluid resuscitation + 7 u pRBC, WWP on exam, required ursula hugger. Now w/ hemoglobin rang of 7-8, not requiring any pressors.       PULM  #Extubated  Pt now extubated (3/8). % on room air, refusing HFNC and NC.     RENAL  #MEERA  Cr 2.03 on admission (3/6). Likely pre-renal MEERA.. Unknown baseline Cr. I/s/o severe sepsis, Cr improving w/ treatment of sepsis.  - Cr continuing to downtrend, 1.26 (3/8) -> 1.12 (3/9)  - brink removed 3/8.  - renally dose meds  - continue to trend creatinine    #metabolic acidosis- resolved  pH 7.04 on admission. Improved after pushes of bicarb in ED. Repeat ABG 7.3     HEME  #Normocytic anemia  Likely acute on chronic. P/w Hgb 2.2 and hypoproliferative retic index on admission. No signs of bleeding. No known malignancies. Unknown if has CKD but if so could contribute to AOCD. MCV normocytic so unlikely to have thalassemia. S/p 7u pRBC. normal hemolysis labs. Highest on differential for anemia is bird mites biting and sucking on blood.  - Hgb trend 7.7 -> 7.8 -> 7.6 (3/8) -> 7.6 (3/9)  - active T&S  - transfuse for hgb <7    SKIN  #Bird mites   came to [facility] and identified bugs as "bird mites". He was decontaminated at [facility]. Bugs seen on patient and sheets at [facility] ICU (3/7) , and within vEEG dressing.  - s/p permethrin wash and shaving   - c/w permethrin wash as needed    ENDO  - q4h fingersticks  - TSH 2.64    ID  #Severe sepsis- resolving  On admission hypothermic with leukocytosis, on ursula hugger and required right femoral warming catheter. Hypothermia likely 2/2 environment and infection. CT A/P w/ complete consolidation of visualized RLL, likely source of sepsis  - c/w Zosyn at pseudomonal dosing 4.5mg Q8Hrs (3/6- )   - s/p Vanc 1g Q24Hrs (3/6-3/7)  - MRSA swab w/ +MSSA  - f/u BCx  - f/u UCx and SCx  - f/u sputum culture     #Pneumonia  CT A/P: Complete consolidation of the visualized right lower lobe, possibly aspiration/pneumonia.  - c/w Zosyn 4.5mg TID (3/6-)     Preventive Measures:   F: s/p 4L NS and 7u prbc  E: replenish as appropriate  N: NPO  VTE Prophylaxis: Heparin 5000 TID  GI: PPI BID  C: Full Code  D: MICU   52yo M with unknown PMH, undomiciled, presented to [facility] by EMS after being found on the ground. Arrived to ED with AMS, severe anemia, metabolic acidosis. Intubated for airway protection at [facility]. Now s/p extubation on 3/8, admitted to MICU for further management.     NEURO    #Sedation  Patient now on sedation of Precedex due to severe agitation this morning.  - continue to wean Precedex as tolerated w/ agitation  - haldol as needed for agitation    #Seizure  No known past seizure hx but with witnessed seizure at [facility] w/ left eye deviation during seizure. S/p Keppra loading dose  CT head wnl. Utox neg.   - vEEG reports 3/8: Moderate generalized slowing suggestive of a moderate degree of diffuse or multifocal dysfunction. Some improvement in background slowing from previous day.   - ativan 2mg prn for seizure activity >2min    #Metabolic encephalopathy  AMS upon arrival to [facility] prior to intubation, unknown baseline mental status, s/p witnessed seizure. Likely 2/2 severe sepsis and anemia. Patient now extubated, AAOx1 to time and speaking incoherently.   - psych consult for concern for possible psych hx affecting patient's mental status  - c/w thiamine and folic acid  - sedation/ Precedex being weaned since extubation on 3/8, however was continued this morning due to agitation. Continue to wean as tolerated    #?Psych history  Patient unable to report medical history. Very agitated, incoherent speech. Required Versed 1mg for imaging. S/p IV Haldol 5mg for agitation during day 3/9.   - psych consult, f/u recs  - Haldol as needed for agitation    #Ataxic gait  Ataxic as per ED. CT head wnl.  - f/u B12, folate  - PT once patient is more stable     CV    #Left bundle branch block  Left bundle of unknown chronicity. Upsloping T waves in V2/V3. Trop I wnl.  No clinical signs of HF, BNP 3100. Likely w/ some component of demand ischemia.  - F/u TTE  - Trop's plateaued  - Repeat ekg when patient not agitated    #Hypovolemic shock- Resolved  Likely 2/2 to PNA and bird mites sucking blood causing anemia of hemoglobin of 2.2. Received fluid resuscitation + 7 u pRBC, WWP on exam, required ursula hugger. Now w/ hemoglobin rang of 7-8, not requiring any pressors.       PULM  #Extubated  Pt now extubated (3/8). % on room air, refusing HFNC and NC.     RENAL  #MEERA  Cr 2.03 on admission (3/6). Likely pre-renal MEERA.. Unknown baseline Cr. I/s/o severe sepsis, Cr improving w/ treatment of sepsis.  - Cr continuing to downtrend, 1.26 (3/8) -> 1.12 (3/9)  - brink removed 3/8.  - renally dose meds  - continue to trend creatinine    #metabolic acidosis- resolved  pH 7.04 on admission. Improved after pushes of bicarb in ED. Repeat ABG 7.3     HEME  #Normocytic anemia  Likely acute on chronic. P/w Hgb 2.2 and hypoproliferative retic index on admission. No signs of bleeding. No known malignancies. Unknown if has CKD but if so could contribute to AOCD. MCV normocytic so unlikely to have thalassemia. S/p 7u pRBC. normal hemolysis labs. Highest on differential for anemia is bird mites biting and sucking on blood.  - Hgb trend 7.7 -> 7.8 -> 7.6 (3/8) -> 7.6 (3/9)  - active T&S  - transfuse for hgb <7    SKIN  #Bird mites   came to [facility] and identified bugs as "bird mites". He was decontaminated at [facility]. Bugs seen on patient and sheets at [facility] ICU (3/7) , and within vEEG dressing.  - s/p permethrin wash and shaving   - c/w permethrin wash as needed    ENDO  - q4h fingersticks  - TSH 2.64    ID  #Severe sepsis- resolving  On admission hypothermic with leukocytosis, on ursula hugger and required right femoral warming catheter. Hypothermia likely 2/2 environment and infection. CT A/P w/ complete consolidation of visualized RLL, likely source of sepsis  - c/w Zosyn at pseudomonal dosing 4.5mg Q8Hrs (3/6- )   - s/p Vanc 1g Q24Hrs (3/6-3/7)  - MRSA swab w/ +MSSA  - f/u BCx  - f/u UCx and SCx  - f/u sputum culture     #Pneumonia  CT A/P: Complete consolidation of the visualized right lower lobe, possibly aspiration/pneumonia.  - c/w Zosyn 4.5mg TID (3/6-)     Preventive Measures:   F: s/p 4L NS and 7u prbc  E: replenish as appropriate  N: Regular  VTE Prophylaxis: Heparin 5000 TID  GI: PPI BID  C: Full Code  D: MICU   54yo M with unknown PMH, undomiciled, presented to [facility] by EMS in septic shock after being found on the ground. Arrived to ED with AMS, severe anemia w/ hgb of 2.2 now s/p 7units pRBC w/ hgb 7-8, hypothermia, and metabolic acidosis now resolved. Had a witnessed seizure at [facility], intubated for airway protection, and admitted to MICU for further management. Now s/p extubation on 3/8.     NEURO    #Sedation  Patient now on sedation of Precedex due to severe agitation this morning.  - continue to wean Precedex as tolerated w/ agitation  - haldol as needed for agitation    #Seizure  No known past seizure hx but with witnessed seizure at [facility] w/ left eye deviation during seizure. S/p Keppra loading dose  CT head wnl. Utox neg.   - vEEG reports 3/8: Moderate generalized slowing suggestive of a moderate degree of diffuse or multifocal dysfunction. Some improvement in background slowing from previous day.   - ativan 2mg prn for seizure activity >2min    #Metabolic encephalopathy  AMS upon arrival to [facility] prior to intubation, unknown baseline mental status, s/p witnessed seizure. Likely 2/2 severe sepsis and anemia. Patient now extubated, AAOx1 to time and speaking incoherently.   - psych consult for concern for possible psych hx affecting patient's mental status  - c/w thiamine and folic acid  - sedation/ Precedex being weaned since extubation on 3/8, however was continued this morning due to agitation. Continue to wean as tolerated    #?Psych history  Patient unable to report medical history. Very agitated, incoherent speech. Required Versed 1mg for imaging. S/p IV Haldol 5mg for agitation during day 3/9.   - psych consult, f/u recs  - Haldol as needed for agitation    #Ataxic gait  Ataxic as per ED. CT head wnl.  - f/u B12, folate  - PT once patient is more stable     CV    #Left bundle branch block  Left bundle of unknown chronicity. Upsloping T waves in V2/V3. Trop I wnl.  No clinical signs of HF, BNP 3100. Likely w/ some component of demand ischemia.  - F/u TTE  - Trop's plateaued  - Repeat ekg when patient not agitated    #Hypovolemic shock- Resolved  Likely 2/2 to PNA and bird mites sucking blood causing anemia of hemoglobin of 2.2. Received fluid resuscitation + 7 u pRBC, WWP on exam, required ursula hugger. Now w/ hemoglobin rang of 7-8, not requiring any pressors.       PULM  #Extubated  Pt now extubated (3/8). % on room air, refusing HFNC and NC.     RENAL  #MEERA  Cr 2.03 on admission (3/6). Likely pre-renal MEERA.. Unknown baseline Cr. I/s/o severe sepsis, Cr improving w/ treatment of sepsis.  - Cr continuing to downtrend, 1.26 (3/8) -> 1.12 (3/9)  - brink removed 3/8.  - renally dose meds  - continue to trend creatinine    #metabolic acidosis- resolved  pH 7.04 on admission. Improved after pushes of bicarb in ED. Repeat ABG 7.3     HEME  #Normocytic anemia  Likely acute on chronic. P/w Hgb 2.2 and hypoproliferative retic index on admission. No signs of bleeding. No known malignancies. Unknown if has CKD but if so could contribute to AOCD. MCV normocytic so unlikely to have thalassemia. S/p 7u pRBC. normal hemolysis labs. Highest on differential for anemia is bird mites biting and sucking on blood.  - Hgb trend 7.7 -> 7.8 -> 7.6 (3/8) -> 7.6 (3/9)  - active T&S  - transfuse for hgb <7    SKIN  #Bird mites   came to [facility] and identified bugs as "bird mites". He was decontaminated at [facility]. Bugs seen on patient and sheets at [facility] ICU (3/7) , and within vEEG dressing.  - s/p permethrin wash and shaving   - c/w permethrin wash as needed    ENDO  - q4h fingersticks  - TSH 2.64    ID  #Severe sepsis- resolving  On admission hypothermic with leukocytosis, on ursula hugger and required right femoral warming catheter. Hypothermia likely 2/2 environment and infection. CT A/P w/ complete consolidation of visualized RLL, likely source of sepsis  - c/w Zosyn at pseudomonal dosing 4.5mg Q8Hrs (3/6- )   - s/p Vanc 1g Q24Hrs (3/6-3/7)  - MRSA swab w/ +MSSA  - f/u BCx  - f/u UCx and SCx  - f/u sputum culture     #Pneumonia  CT A/P: Complete consolidation of the visualized right lower lobe, possibly aspiration/pneumonia.  - c/w Zosyn 4.5mg TID (3/6-)     Preventive Measures:   F: s/p 4L NS and 7u prbc  E: replenish as appropriate  N: Regular  VTE Prophylaxis: Heparin 5000 TID  GI: PPI BID  C: Full Code  D: MICU

## 2023-03-09 NOTE — PROCEDURE NOTE - NSPROCDETAILS_GEN_ALL_CORE
location identified, draped/prepped, sterile technique used/blood seen on insertion/dressing applied/flushes easily/secured in place/sterile technique, catheter placed
ultrasound-guidance/location identified, draped/prepped, sterile technique used/blood seen on insertion/dressing applied/flushes easily/secured in place/sterile technique, catheter placed
location identified, draped/prepped, sterile technique used, needle inserted/introduced/positive blood return obtained via catheter/connected to a pressurized flush line/sutured in place/hemostasis with direct pressure, dressing applied/Seldinger technique/all materials/supplies accounted for at end of procedure
guidewire recovered/lumen(s) aspirated and flushed/sterile dressing applied/sterile technique, catheter placed/ultrasound guidance with use of sterile gel and probe cove

## 2023-03-09 NOTE — PROCEDURE NOTE - ADDITIONAL PROCEDURE DETAILS
Left femoral quattro intravascular heat exchange catheter placed via sterile technique under ultrasound guidance with MD Rios, x 1 attempt.   Wire visualized in left femoral vein under ultrasound in long and short axis views prior to dilation.
PIV x1 inserted to R upper arm, x 1 attempt and patient tolerated well.
R Radial arterial line placed with ultrasound guidance, x 1 attempt.

## 2023-03-09 NOTE — BH CONSULTATION LIAISON ASSESSMENT NOTE - ADDITIONAL PSYCHIATRIC MEDICATIONS
Pt receiving prn Haldol 5mg for agitation. Is also on Precedex gtt.  Pt has been received  Haldol 5mg X 1, Haldol 2 mg X 1 on 3/8, Haldol 5 mg X 2 on 3/9 for agitation.

## 2023-03-09 NOTE — PROGRESS NOTE ADULT - SUBJECTIVE AND OBJECTIVE BOX
OVERNIGHT EVENTS:  Given versed 1mg for CT a/p. CT A/p read w/  No evidence of retroperitoneal or thigh hematoma. Complete consolidation of the visualized right lower lobe. Had 1x bowel movement.    SUBJECTIVE / INTERVAL HPI: Patient seen and examined at bedside. This morning he is alert, oriented only to time (2023). Says his name is Isaiah, unable to give last name or . Speaking noncoherent and becoming agitated on questioning of his prior history. Unable to complete ROS due to mental status.    VITAL SIGNS:  Vital Signs Last 24 Hrs  T(C): 36.4 (09 Mar 2023 06:04), Max: 36.4 (09 Mar 2023 01:03)  T(F): 97.5 (09 Mar 2023 06:04), Max: 97.6 (09 Mar 2023 01:03)  HR: 60 (09 Mar 2023 11:00) (60 - 95)  BP: 104/74 (09 Mar 2023 09:39) (89/53 - 132/71)  BP(mean): 86 (09 Mar 2023 09:39) (65 - 88)  RR: 16 (09 Mar 2023 08:45) (4 - 22)  SpO2: 100% (09 Mar 2023 11:00) (78% - 100%)    Parameters below as of 09 Mar 2023 08:45  Patient On (Oxygen Delivery Method): room air    PHYSICAL EXAM:  Constitutional: NAD, resting in bed;  Neck: supple; no JVD or thyromegaly, no lymphadenopathy;  Respiratory: CTA B/L; no W/R/R  Cardiac: +S1/S2; RRR; no M/R/G  Gastrointestinal: soft, moderately distended, non-tender; no palpable masses  Extremities: WWP, no clubbing or cyanosis; no peripheral edema  Musculoskeletal: no joint swelling or erythema;  Vascular: 2+ radial, DP/PT pulses B/L;  Dermatologic: skin warm, dry and intact; no rashes, wounds, or scars, no bite marks, no erythema noted;  Neurologic: AAOx1 to time;    MEDICATIONS:  MEDICATIONS  (STANDING):  chlorhexidine 2% Cloths 1 Application(s) Topical <User Schedule>  dexMEDEtomidine Infusion 0.4 MICROgram(s)/kG/Hr (7 mL/Hr) IV Continuous <Continuous>  dextrose 5%. 1000 milliLiter(s) (100 mL/Hr) IV Continuous <Continuous>  dextrose 5%. 1000 milliLiter(s) (50 mL/Hr) IV Continuous <Continuous>  dextrose 50% Injectable 25 Gram(s) IV Push once  dextrose 50% Injectable 12.5 Gram(s) IV Push once  dextrose 50% Injectable 25 Gram(s) IV Push once  folic acid Injectable 1 milliGRAM(s) IV Push daily  glucagon  Injectable 1 milliGRAM(s) IntraMuscular once  heparin   Injectable 5000 Unit(s) SubCutaneous every 8 hours  insulin lispro (ADMELOG) corrective regimen sliding scale   SubCutaneous Before meals and at bedtime  pantoprazole  Injectable 40 milliGRAM(s) IV Push two times a day  piperacillin/tazobactam IVPB.. 4.5 Gram(s) IV Intermittent every 8 hours    MEDICATIONS  (PRN):  dextrose Oral Gel 15 Gram(s) Oral once PRN Blood Glucose LESS THAN 70 milliGRAM(s)/deciliter      ALLERGIES:  Allergies    Allergy Status Unknown    Intolerances        LABS:                        7.6    17.66 )-----------( 202      ( 09 Mar 2023 05:30 )             23.7     03-09    142  |  110<H>  |  25<H>  ----------------------------<  106<H>  4.0   |  26  |  1.12    Ca    8.2<L>      09 Mar 2023 05:30  Phos  3.2     03-09  Mg     2.4     03-09    TPro  5.9<L>  /  Alb  2.5<L>  /  TBili  0.4  /  DBili  x   /  AST  34  /  ALT  35  /  AlkPhos  53  03-09        CAPILLARY BLOOD GLUCOSE      POCT Blood Glucose.: 103 mg/dL (09 Mar 2023 05:43)      RADIOLOGY & ADDITIONAL TESTS: Reviewed. OVERNIGHT EVENTS:  Given versed 1mg for CT a/p. CT A/p read w/ no evidence of retroperitoneal or thigh hematoma. Complete consolidation of the visualized right lower lobe. Had 1x bowel movement.    SUBJECTIVE / INTERVAL HPI: Patient seen and examined at bedside. This morning he is alert, oriented only to time (2023). Says his name is Isaiah, unable to give last name or . Speaking noncoherent and becoming agitated on questioning of his prior history. Unable to complete ROS due to mental status.    VITAL SIGNS:  Vital Signs Last 24 Hrs  T(C): 36.4 (09 Mar 2023 06:04), Max: 36.4 (09 Mar 2023 01:03)  T(F): 97.5 (09 Mar 2023 06:04), Max: 97.6 (09 Mar 2023 01:03)  HR: 60 (09 Mar 2023 11:00) (60 - 95)  BP: 104/74 (09 Mar 2023 09:39) (89/53 - 132/71)  BP(mean): 86 (09 Mar 2023 09:39) (65 - 88)  RR: 16 (09 Mar 2023 08:45) (4 - 22)  SpO2: 100% (09 Mar 2023 11:00) (78% - 100%)    Parameters below as of 09 Mar 2023 08:45  Patient On (Oxygen Delivery Method): room air    PHYSICAL EXAM:  Constitutional: NAD, resting in bed;  Neck: supple; no JVD or thyromegaly, no lymphadenopathy;  Respiratory: CTA B/L; no W/R/R  Cardiac: +S1/S2; RRR; no M/R/G  Gastrointestinal: soft, moderately distended, non-tender; no palpable masses  Extremities: WWP, no clubbing or cyanosis; no peripheral edema  Musculoskeletal: no joint swelling or erythema;  Vascular: 2+ radial, DP/PT pulses B/L;  Dermatologic: skin warm, dry and intact; no rashes, wounds, or scars, no bite marks, no erythema noted;  Neurologic: AAOx1 to time;    MEDICATIONS:  MEDICATIONS  (STANDING):  chlorhexidine 2% Cloths 1 Application(s) Topical <User Schedule>  dexMEDEtomidine Infusion 0.4 MICROgram(s)/kG/Hr (7 mL/Hr) IV Continuous <Continuous>  dextrose 5%. 1000 milliLiter(s) (100 mL/Hr) IV Continuous <Continuous>  dextrose 5%. 1000 milliLiter(s) (50 mL/Hr) IV Continuous <Continuous>  dextrose 50% Injectable 25 Gram(s) IV Push once  dextrose 50% Injectable 12.5 Gram(s) IV Push once  dextrose 50% Injectable 25 Gram(s) IV Push once  folic acid Injectable 1 milliGRAM(s) IV Push daily  glucagon  Injectable 1 milliGRAM(s) IntraMuscular once  heparin   Injectable 5000 Unit(s) SubCutaneous every 8 hours  insulin lispro (ADMELOG) corrective regimen sliding scale   SubCutaneous Before meals and at bedtime  pantoprazole  Injectable 40 milliGRAM(s) IV Push two times a day  piperacillin/tazobactam IVPB.. 4.5 Gram(s) IV Intermittent every 8 hours    MEDICATIONS  (PRN):  dextrose Oral Gel 15 Gram(s) Oral once PRN Blood Glucose LESS THAN 70 milliGRAM(s)/deciliter      ALLERGIES:  Allergies    Allergy Status Unknown    Intolerances        LABS:                        7.6    17.66 )-----------( 202      ( 09 Mar 2023 05:30 )             23.7     03-09    142  |  110<H>  |  25<H>  ----------------------------<  106<H>  4.0   |  26  |  1.12    Ca    8.2<L>      09 Mar 2023 05:30  Phos  3.2     03-09  Mg     2.4     03-09    TPro  5.9<L>  /  Alb  2.5<L>  /  TBili  0.4  /  DBili  x   /  AST  34  /  ALT  35  /  AlkPhos  53  03-09        CAPILLARY BLOOD GLUCOSE      POCT Blood Glucose.: 103 mg/dL (09 Mar 2023 05:43)      RADIOLOGY & ADDITIONAL TESTS: Reviewed.

## 2023-03-09 NOTE — PROCEDURE NOTE - NSINDICATIONS_GEN_A_CORE
rapid rewarming/critical illness/emergency venous access/hemodynamic monitoring/venous access/volume resuscitation
antibiotic therapy
emergency venous access
arterial puncture to obtain ABG's/blood sampling/critical patient/monitoring purposes

## 2023-03-09 NOTE — BH CONSULTATION LIAISON ASSESSMENT NOTE - SUMMARY
54 yo M w/ unknown PMHx, brought to [facility] from [facility] by EMS after he was found on the ground. While admitted to [facility] ED, pt noted he was homeless and living on the street. Noted to have "bed bugs" as well. Pt was hypothermic to 85F in ED, intubated for airway protection, and also had a witnessed seizure requiring Keppra. Pt also required 2u prbcs, which improved his hgb to 3.3. Since admission to [facility] MICU, pt has been extubated but remains on sedation 2/2 severe agitation. Pt noted to throw food and items around the room and nurses, requiring precedex gtt and Haldol prn agitation.   Psych consulted for evaluation of agitation, r/o underlying psychiatric condition. Of note, pt is refusing exam. Unable to evaluate baseline mental status. From EEG interpretation, signs of slowing in line with high suspicion for delirium. Unable to rule out other underlying psychiatric conditions at this time.  Pt is a 54 yo AA M w/ unknown PMHx, brought to [facility] from [facility] by EMS after he was found on the ground. While admitted to [facility] ED, pt noted he was homeless and living on the street. Noted to have bed bugs vs lice as well. Pt was hypothermic to 85F in ED, intubated for airway protection, and also had a witnessed seizure requiring Keppra (now discontinued). Pt required 2u prbcs. Pt has been extubated but remains on sedation 2/2 severe agitation. Pt noted to throw food and items around the room and nurses, requiring Precedex gtt and Haldol prn agitation.     Psychiatry consulted for evaluation of agitation, r/o underlying psychiatric condition. Pt is not able to participate in full evaluation secondary to altered MS.  Given EEG findings, confusion, clinical picture is suggestive of delirium. However underlying psychiatric condition can not be ruled out.

## 2023-03-09 NOTE — BH CONSULTATION LIAISON ASSESSMENT NOTE - OTHER
Nurse and Pt chart Nurse and pt's chart unable to assess lethargic at times, restless at other times incoherent dry skin, disheveled, lice, dry oral mucosa Delirium/impulsive behavior since admission

## 2023-03-09 NOTE — BH CONSULTATION LIAISON ASSESSMENT NOTE - NSBHCONSULTMEDAGITATION_PSY_A_CORE FT
Haldol 5 mg po/IV/IM q6 hrs prn agitation. If QTc is prolonged consider using Olanzapine 5 mg po/IM q 6 hrs prn agitation.   If pt continues to be agitated may start Depakote 250 mg bid. May use Trazodone 50 mg qhs prn insomina

## 2023-03-09 NOTE — BH CONSULTATION LIAISON ASSESSMENT NOTE - CURRENT MEDICATION
MEDICATIONS  (STANDING):  chlorhexidine 2% Cloths 1 Application(s) Topical <User Schedule>  dexMEDEtomidine Infusion 0.4 MICROgram(s)/kG/Hr (7 mL/Hr) IV Continuous <Continuous>  dextrose 5%. 1000 milliLiter(s) (100 mL/Hr) IV Continuous <Continuous>  dextrose 5%. 1000 milliLiter(s) (50 mL/Hr) IV Continuous <Continuous>  dextrose 50% Injectable 25 Gram(s) IV Push once  dextrose 50% Injectable 12.5 Gram(s) IV Push once  dextrose 50% Injectable 25 Gram(s) IV Push once  folic acid Injectable 1 milliGRAM(s) IV Push daily  glucagon  Injectable 1 milliGRAM(s) IntraMuscular once  heparin   Injectable 5000 Unit(s) SubCutaneous every 8 hours  insulin lispro (ADMELOG) corrective regimen sliding scale   SubCutaneous Before meals and at bedtime  pantoprazole  Injectable 40 milliGRAM(s) IV Push two times a day  piperacillin/tazobactam IVPB.. 4.5 Gram(s) IV Intermittent every 8 hours    MEDICATIONS  (PRN):  dextrose Oral Gel 15 Gram(s) Oral once PRN Blood Glucose LESS THAN 70 milliGRAM(s)/deciliter

## 2023-03-09 NOTE — BH CONSULTATION LIAISON ASSESSMENT NOTE - RISK ASSESSMENT
Full risk assessment can not be completed due to pt's current MS.   Pt is at increased risk of agitation given intermittent agitation since admission.

## 2023-03-10 LAB
ALBUMIN SERPL ELPH-MCNC: 2.9 G/DL — LOW (ref 3.3–5)
ALP SERPL-CCNC: 61 U/L — SIGNIFICANT CHANGE UP (ref 40–120)
ALT FLD-CCNC: 35 U/L — SIGNIFICANT CHANGE UP (ref 10–45)
ANION GAP SERPL CALC-SCNC: 7 MMOL/L — SIGNIFICANT CHANGE UP (ref 5–17)
AST SERPL-CCNC: 33 U/L — SIGNIFICANT CHANGE UP (ref 10–40)
BASOPHILS # BLD AUTO: 0.04 K/UL — SIGNIFICANT CHANGE UP (ref 0–0.2)
BASOPHILS NFR BLD AUTO: 0.2 % — SIGNIFICANT CHANGE UP (ref 0–2)
BILIRUB SERPL-MCNC: 0.4 MG/DL — SIGNIFICANT CHANGE UP (ref 0.2–1.2)
BUN SERPL-MCNC: 24 MG/DL — HIGH (ref 7–23)
CALCIUM SERPL-MCNC: 8.7 MG/DL — SIGNIFICANT CHANGE UP (ref 8.4–10.5)
CHLORIDE SERPL-SCNC: 109 MMOL/L — HIGH (ref 96–108)
CO2 SERPL-SCNC: 24 MMOL/L — SIGNIFICANT CHANGE UP (ref 22–31)
CREAT SERPL-MCNC: 1.13 MG/DL — SIGNIFICANT CHANGE UP (ref 0.5–1.3)
CULTURE RESULTS: SIGNIFICANT CHANGE UP
EGFR: 78 ML/MIN/1.73M2 — SIGNIFICANT CHANGE UP
EOSINOPHIL # BLD AUTO: 0.52 K/UL — HIGH (ref 0–0.5)
EOSINOPHIL NFR BLD AUTO: 2.9 % — SIGNIFICANT CHANGE UP (ref 0–6)
GLUCOSE SERPL-MCNC: 101 MG/DL — HIGH (ref 70–99)
HCT VFR BLD CALC: 27.9 % — LOW (ref 39–50)
HGB BLD-MCNC: 8.7 G/DL — LOW (ref 13–17)
IMM GRANULOCYTES NFR BLD AUTO: 1.1 % — HIGH (ref 0–0.9)
LYMPHOCYTES # BLD AUTO: 0.79 K/UL — LOW (ref 1–3.3)
LYMPHOCYTES # BLD AUTO: 4.5 % — LOW (ref 13–44)
MAGNESIUM SERPL-MCNC: 2.2 MG/DL — SIGNIFICANT CHANGE UP (ref 1.6–2.6)
MCHC RBC-ENTMCNC: 26.1 PG — LOW (ref 27–34)
MCHC RBC-ENTMCNC: 31.2 GM/DL — LOW (ref 32–36)
MCV RBC AUTO: 83.8 FL — SIGNIFICANT CHANGE UP (ref 80–100)
MONOCYTES # BLD AUTO: 0.64 K/UL — SIGNIFICANT CHANGE UP (ref 0–0.9)
MONOCYTES NFR BLD AUTO: 3.6 % — SIGNIFICANT CHANGE UP (ref 2–14)
NEUTROPHILS # BLD AUTO: 15.56 K/UL — HIGH (ref 1.8–7.4)
NEUTROPHILS NFR BLD AUTO: 87.7 % — HIGH (ref 43–77)
NRBC # BLD: 0 /100 WBCS — SIGNIFICANT CHANGE UP (ref 0–0)
PHOSPHATE SERPL-MCNC: 3.5 MG/DL — SIGNIFICANT CHANGE UP (ref 2.5–4.5)
PLATELET # BLD AUTO: 202 K/UL — SIGNIFICANT CHANGE UP (ref 150–400)
POTASSIUM SERPL-MCNC: 3.9 MMOL/L — SIGNIFICANT CHANGE UP (ref 3.5–5.3)
POTASSIUM SERPL-SCNC: 3.9 MMOL/L — SIGNIFICANT CHANGE UP (ref 3.5–5.3)
PROT SERPL-MCNC: 6.7 G/DL — SIGNIFICANT CHANGE UP (ref 6–8.3)
RBC # BLD: 3.33 M/UL — LOW (ref 4.2–5.8)
RBC # FLD: 23.9 % — HIGH (ref 10.3–14.5)
SODIUM SERPL-SCNC: 140 MMOL/L — SIGNIFICANT CHANGE UP (ref 135–145)
SPECIMEN SOURCE: SIGNIFICANT CHANGE UP
WBC # BLD: 17.74 K/UL — HIGH (ref 3.8–10.5)
WBC # FLD AUTO: 17.74 K/UL — HIGH (ref 3.8–10.5)

## 2023-03-10 PROCEDURE — 99232 SBSQ HOSP IP/OBS MODERATE 35: CPT

## 2023-03-10 PROCEDURE — 99291 CRITICAL CARE FIRST HOUR: CPT | Mod: GC

## 2023-03-10 RX ORDER — TRAZODONE HCL 50 MG
50 TABLET ORAL AT BEDTIME
Refills: 0 | Status: DISCONTINUED | OUTPATIENT
Start: 2023-03-10 | End: 2023-03-14

## 2023-03-10 RX ORDER — POTASSIUM CHLORIDE 20 MEQ
20 PACKET (EA) ORAL ONCE
Refills: 0 | Status: COMPLETED | OUTPATIENT
Start: 2023-03-10 | End: 2023-03-10

## 2023-03-10 RX ORDER — DIVALPROEX SODIUM 500 MG/1
500 TABLET, DELAYED RELEASE ORAL EVERY 12 HOURS
Refills: 0 | Status: DISCONTINUED | OUTPATIENT
Start: 2023-03-10 | End: 2023-03-14

## 2023-03-10 RX ORDER — CEFPODOXIME PROXETIL 100 MG
200 TABLET ORAL EVERY 12 HOURS
Refills: 0 | Status: COMPLETED | OUTPATIENT
Start: 2023-03-10 | End: 2023-03-12

## 2023-03-10 RX ORDER — HALOPERIDOL DECANOATE 100 MG/ML
2.5 INJECTION INTRAMUSCULAR
Refills: 0 | Status: DISCONTINUED | OUTPATIENT
Start: 2023-03-10 | End: 2023-03-14

## 2023-03-10 RX ORDER — HALOPERIDOL DECANOATE 100 MG/ML
5 INJECTION INTRAMUSCULAR EVERY 6 HOURS
Refills: 0 | Status: DISCONTINUED | OUTPATIENT
Start: 2023-03-10 | End: 2023-03-10

## 2023-03-10 RX ORDER — HALOPERIDOL DECANOATE 100 MG/ML
5 INJECTION INTRAMUSCULAR ONCE
Refills: 0 | Status: DISCONTINUED | OUTPATIENT
Start: 2023-03-10 | End: 2023-03-10

## 2023-03-10 RX ORDER — HALOPERIDOL DECANOATE 100 MG/ML
5 INJECTION INTRAMUSCULAR EVERY 6 HOURS
Refills: 0 | Status: DISCONTINUED | OUTPATIENT
Start: 2023-03-10 | End: 2023-03-14

## 2023-03-10 RX ORDER — HALOPERIDOL DECANOATE 100 MG/ML
5 INJECTION INTRAMUSCULAR ONCE
Refills: 0 | Status: COMPLETED | OUTPATIENT
Start: 2023-03-10 | End: 2023-03-10

## 2023-03-10 RX ADMIN — HALOPERIDOL DECANOATE 2.5 MILLIGRAM(S): 100 INJECTION INTRAMUSCULAR at 07:21

## 2023-03-10 RX ADMIN — HALOPERIDOL DECANOATE 2.5 MILLIGRAM(S): 100 INJECTION INTRAMUSCULAR at 04:56

## 2023-03-10 RX ADMIN — Medication 200 MILLIGRAM(S): at 12:19

## 2023-03-10 RX ADMIN — DEXMEDETOMIDINE HYDROCHLORIDE IN 0.9% SODIUM CHLORIDE 7 MICROGRAM(S)/KG/HR: 4 INJECTION INTRAVENOUS at 00:50

## 2023-03-10 RX ADMIN — HALOPERIDOL DECANOATE 5 MILLIGRAM(S): 100 INJECTION INTRAMUSCULAR at 12:18

## 2023-03-10 RX ADMIN — PANTOPRAZOLE SODIUM 40 MILLIGRAM(S): 20 TABLET, DELAYED RELEASE ORAL at 05:11

## 2023-03-10 RX ADMIN — PIPERACILLIN AND TAZOBACTAM 25 GRAM(S): 4; .5 INJECTION, POWDER, LYOPHILIZED, FOR SOLUTION INTRAVENOUS at 02:04

## 2023-03-10 RX ADMIN — HALOPERIDOL DECANOATE 2.5 MILLIGRAM(S): 100 INJECTION INTRAMUSCULAR at 02:01

## 2023-03-10 RX ADMIN — DEXMEDETOMIDINE HYDROCHLORIDE IN 0.9% SODIUM CHLORIDE 7 MICROGRAM(S)/KG/HR: 4 INJECTION INTRAVENOUS at 04:56

## 2023-03-10 RX ADMIN — Medication 2 MILLIGRAM(S): at 12:18

## 2023-03-10 RX ADMIN — Medication 52.5 MILLIGRAM(S): at 05:10

## 2023-03-10 NOTE — PROVIDER CONTACT NOTE (OTHER) - ACTION/TREATMENT ORDERED:
Per Dr. Briseno pt can remain off telemetry monitoring. pt will be stepped down to regional medical floors
haldol PRN
security,  Kirit, Dr. Briseno, CARLOS Cuba, Paul Rosas at bedside to discuss action/treatment plan

## 2023-03-10 NOTE — BH CONSULTATION LIAISON PROGRESS NOTE - ADDITIONAL PSYCHIATRIC MEDICATIONS
Pt has been received  Haldol 5mg X 1, Haldol 2 mg X 1 on 3/8, Haldol 5 mg X 2 on 3/9 for agitation.

## 2023-03-10 NOTE — BH CONSULTATION LIAISON PROGRESS NOTE - OTHER
focused on food and being "left alone" dry skin, disheveled lethargic at times, restless at other times

## 2023-03-10 NOTE — PROVIDER CONTACT NOTE (OTHER) - ASSESSMENT
pt refusing assessment, urinating on floor, defecating on floor, spitting at staff, verbally abusing staff, pulled out IV, pulled off telemetry leads, pt verbalized " I will throw myself on the floor"
patient agitated and restless. on precedex infusion. pulling off ecg leads, nasal cannula, and o2 probe.
pt refusing tele leads and BP readings

## 2023-03-10 NOTE — PROGRESS NOTE ADULT - SUBJECTIVE AND OBJECTIVE BOX
INTERVAL EVENTS: started precedex for agitation overnight    SUBJECTIVE / INTERVAL HPI: Patient seen and examined at bedside. Not willing to answer questions.    ROS: negative unless otherwise stated above.    VITAL SIGNS:  Vital Signs Last 24 Hrs  T(C): 35.6 (10 Mar 2023 06:00), Max: 36.4 (10 Mar 2023 01:21)  T(F): 96.1 (10 Mar 2023 06:00), Max: 97.5 (10 Mar 2023 01:21)  HR: 61 (10 Mar 2023 08:00) (49 - 91)  BP: 118/86 (10 Mar 2023 08:00) (113/76 - 132/97)  BP(mean): 99 (10 Mar 2023 08:00) (87 - 111)  RR: 27 (10 Mar 2023 08:00) (16 - 27)  SpO2: 88% (10 Mar 2023 08:00) (77% - 100%)    Parameters below as of 10 Mar 2023 08:00  Patient On (Oxygen Delivery Method): room air          03-09-23 @ 07:01  -  03-10-23 @ 07:00  --------------------------------------------------------  IN: 446.3 mL / OUT: 275 mL / NET: 171.3 mL    03-10-23 @ 07:01  -  03-10-23 @ 10:28  --------------------------------------------------------  IN: 0 mL / OUT: 0 mL / NET: 0 mL        PHYSICAL EXAM:    General: NAD, laying in bed with bedsheet over face  HEENT: atraumatic  Cardiovascular: RRR  Respiratory: speaking in full sentences, no accessory muscle use  Gastrointestinal: defecating on floor  Uro: urinating on floor  Extremities: moving all extremities, walking around room  Psych: yelling and spitting, cursing    MEDICATIONS:  MEDICATIONS  (STANDING):  chlorhexidine 2% Cloths 1 Application(s) Topical <User Schedule>  dextrose 5%. 1000 milliLiter(s) (100 mL/Hr) IV Continuous <Continuous>  dextrose 5%. 1000 milliLiter(s) (50 mL/Hr) IV Continuous <Continuous>  dextrose 50% Injectable 25 Gram(s) IV Push once  dextrose 50% Injectable 12.5 Gram(s) IV Push once  dextrose 50% Injectable 25 Gram(s) IV Push once  folic acid Injectable 1 milliGRAM(s) IV Push daily  glucagon  Injectable 1 milliGRAM(s) IntraMuscular once  haloperidol    Injectable 5 milliGRAM(s) IntraMuscular every 6 hours  heparin   Injectable 5000 Unit(s) SubCutaneous every 8 hours  insulin lispro (ADMELOG) corrective regimen sliding scale   SubCutaneous Before meals and at bedtime  piperacillin/tazobactam IVPB.. 4.5 Gram(s) IV Intermittent every 8 hours  valproate sodium  IVPB 250 milliGRAM(s) IV Intermittent every 12 hours    MEDICATIONS  (PRN):  dextrose Oral Gel 15 Gram(s) Oral once PRN Blood Glucose LESS THAN 70 milliGRAM(s)/deciliter  haloperidol    Injectable 2.5 milliGRAM(s) IntraMuscular every 3 hours PRN Agitation      ALLERGIES:  Allergies    Allergy Status Unknown    Intolerances        LABS:                        8.7    17.74 )-----------( 202      ( 10 Mar 2023 06:12 )             27.9     03-10    140  |  109<H>  |  24<H>  ----------------------------<  101<H>  3.9   |  24  |  1.13    Ca    8.7      10 Mar 2023 06:12  Phos  3.5     03-10  Mg     2.2     03-10    TPro  6.7  /  Alb  2.9<L>  /  TBili  0.4  /  DBili  x   /  AST  33  /  ALT  35  /  AlkPhos  61  03-10        CAPILLARY BLOOD GLUCOSE      POCT Blood Glucose.: 91 mg/dL (09 Mar 2023 16:16)      RADIOLOGY & ADDITIONAL TESTS: Reviewed. ***STEPDOWN FROM MICU to [facility]***  Hospital Course: 52yo M with unknown PMH, undomiciled, presented to [facility] by EMS after being found on the ground. Arrived to ED with AMS, severe anemia, metabolic acidosis. Had a witnessed seizure at [facility] and was keppra loaded; Intubated for airway protection at [facility]. At [facility], started on pressors for hypovolemic (hemorrhagic) vs septic shock. 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 Was on zosyn to cover for PNA (CT A/P w/ complete consolidation of the visualized right lower lobe), now transitioned to cefpodoxime to complete course. Decontaminated multiple times with permethrin and now can take off isolation precautions as per Epidemiology. Shock now resolved. Psych following for agitation. Was initially on precedex gtt, now weaned off, will c/w haldol prn.    INTERVAL EVENTS: started precedex for agitation overnight    SUBJECTIVE / INTERVAL HPI: Patient seen and examined at bedside. Not willing to answer questions.    ROS: negative unless otherwise stated above.    VITAL SIGNS:  Vital Signs Last 24 Hrs  T(C): 35.6 (10 Mar 2023 06:00), Max: 36.4 (10 Mar 2023 01:21)  T(F): 96.1 (10 Mar 2023 06:00), Max: 97.5 (10 Mar 2023 01:21)  HR: 61 (10 Mar 2023 08:00) (49 - 91)  BP: 118/86 (10 Mar 2023 08:00) (113/76 - 132/97)  BP(mean): 99 (10 Mar 2023 08:00) (87 - 111)  RR: 27 (10 Mar 2023 08:00) (16 - 27)  SpO2: 88% (10 Mar 2023 08:00) (77% - 100%)    Parameters below as of 10 Mar 2023 08:00  Patient On (Oxygen Delivery Method): room air          03-09-23 @ 07:01  -  03-10-23 @ 07:00  --------------------------------------------------------  IN: 446.3 mL / OUT: 275 mL / NET: 171.3 mL    03-10-23 @ 07:01  -  03-10-23 @ 10:28  --------------------------------------------------------  IN: 0 mL / OUT: 0 mL / NET: 0 mL        PHYSICAL EXAM:    General: NAD, laying in bed with bedsheet over face  HEENT: atraumatic  Cardiovascular: RRR  Respiratory: speaking in full sentences, no accessory muscle use  Gastrointestinal: defecating on floor  Uro: urinating on floor  Extremities: moving all extremities, walking around room  Psych: yelling and spitting, cursing    MEDICATIONS:  MEDICATIONS  (STANDING):  chlorhexidine 2% Cloths 1 Application(s) Topical <User Schedule>  dextrose 5%. 1000 milliLiter(s) (100 mL/Hr) IV Continuous <Continuous>  dextrose 5%. 1000 milliLiter(s) (50 mL/Hr) IV Continuous <Continuous>  dextrose 50% Injectable 25 Gram(s) IV Push once  dextrose 50% Injectable 12.5 Gram(s) IV Push once  dextrose 50% Injectable 25 Gram(s) IV Push once  folic acid Injectable 1 milliGRAM(s) IV Push daily  glucagon  Injectable 1 milliGRAM(s) IntraMuscular once  haloperidol    Injectable 5 milliGRAM(s) IntraMuscular every 6 hours  heparin   Injectable 5000 Unit(s) SubCutaneous every 8 hours  insulin lispro (ADMELOG) corrective regimen sliding scale   SubCutaneous Before meals and at bedtime  piperacillin/tazobactam IVPB.. 4.5 Gram(s) IV Intermittent every 8 hours  valproate sodium  IVPB 250 milliGRAM(s) IV Intermittent every 12 hours    MEDICATIONS  (PRN):  dextrose Oral Gel 15 Gram(s) Oral once PRN Blood Glucose LESS THAN 70 milliGRAM(s)/deciliter  haloperidol    Injectable 2.5 milliGRAM(s) IntraMuscular every 3 hours PRN Agitation      ALLERGIES:  Allergies    Allergy Status Unknown    Intolerances        LABS:                        8.7    17.74 )-----------( 202      ( 10 Mar 2023 06:12 )             27.9     03-10    140  |  109<H>  |  24<H>  ----------------------------<  101<H>  3.9   |  24  |  1.13    Ca    8.7      10 Mar 2023 06:12  Phos  3.5     03-10  Mg     2.2     03-10    TPro  6.7  /  Alb  2.9<L>  /  TBili  0.4  /  DBili  x   /  AST  33  /  ALT  35  /  AlkPhos  61  03-10        CAPILLARY BLOOD GLUCOSE      POCT Blood Glucose.: 91 mg/dL (09 Mar 2023 16:16)      RADIOLOGY & ADDITIONAL TESTS: Reviewed. ***STEPDOWN FROM MICU to [facility]***  Hospital Course: 54yo M with unknown PMH, undomiciled, presented to [facility] by EMS after being found on the ground. Arrived to ED with AMS, severe anemia, metabolic acidosis. Had a witnessed seizure at [facility] and was keppra loaded; Intubated for airway protection at [facility]. At [facility], started on pressors for hypovolemic (hemorrhagic) vs septic shock. 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 Was on zosyn to cover for PNA (CT A/P w/ complete consolidation of the visualized right lower lobe), now transitioned to cefpodoxime to complete course. Decontaminated multiple times with permethrin and now can take off isolation precautions as per Epidemiology. Shock now resolved. Psych following for agitation. Was initially on precedex gtt, now weaned off, will c/w haldol prn. Pending further Psych evaluation.    INTERVAL EVENTS: started precedex for agitation overnight    SUBJECTIVE / INTERVAL HPI: Patient seen and examined at bedside. Not willing to answer questions.    ROS: negative unless otherwise stated above.    VITAL SIGNS:  Vital Signs Last 24 Hrs  T(C): 35.6 (10 Mar 2023 06:00), Max: 36.4 (10 Mar 2023 01:21)  T(F): 96.1 (10 Mar 2023 06:00), Max: 97.5 (10 Mar 2023 01:21)  HR: 61 (10 Mar 2023 08:00) (49 - 91)  BP: 118/86 (10 Mar 2023 08:00) (113/76 - 132/97)  BP(mean): 99 (10 Mar 2023 08:00) (87 - 111)  RR: 27 (10 Mar 2023 08:00) (16 - 27)  SpO2: 88% (10 Mar 2023 08:00) (77% - 100%)    Parameters below as of 10 Mar 2023 08:00  Patient On (Oxygen Delivery Method): room air          03-09-23 @ 07:01  -  03-10-23 @ 07:00  --------------------------------------------------------  IN: 446.3 mL / OUT: 275 mL / NET: 171.3 mL    03-10-23 @ 07:01  -  03-10-23 @ 10:28  --------------------------------------------------------  IN: 0 mL / OUT: 0 mL / NET: 0 mL        PHYSICAL EXAM:    General: NAD, laying in bed with bedsheet over face  HEENT: atraumatic  Cardiovascular: RRR  Respiratory: speaking in full sentences, no accessory muscle use  Gastrointestinal: defecating on floor  Uro: urinating on floor  Extremities: moving all extremities, walking around room  Psych: yelling and spitting, cursing    MEDICATIONS:  MEDICATIONS  (STANDING):  chlorhexidine 2% Cloths 1 Application(s) Topical <User Schedule>  dextrose 5%. 1000 milliLiter(s) (100 mL/Hr) IV Continuous <Continuous>  dextrose 5%. 1000 milliLiter(s) (50 mL/Hr) IV Continuous <Continuous>  dextrose 50% Injectable 25 Gram(s) IV Push once  dextrose 50% Injectable 12.5 Gram(s) IV Push once  dextrose 50% Injectable 25 Gram(s) IV Push once  folic acid Injectable 1 milliGRAM(s) IV Push daily  glucagon  Injectable 1 milliGRAM(s) IntraMuscular once  haloperidol    Injectable 5 milliGRAM(s) IntraMuscular every 6 hours  heparin   Injectable 5000 Unit(s) SubCutaneous every 8 hours  insulin lispro (ADMELOG) corrective regimen sliding scale   SubCutaneous Before meals and at bedtime  piperacillin/tazobactam IVPB.. 4.5 Gram(s) IV Intermittent every 8 hours  valproate sodium  IVPB 250 milliGRAM(s) IV Intermittent every 12 hours    MEDICATIONS  (PRN):  dextrose Oral Gel 15 Gram(s) Oral once PRN Blood Glucose LESS THAN 70 milliGRAM(s)/deciliter  haloperidol    Injectable 2.5 milliGRAM(s) IntraMuscular every 3 hours PRN Agitation      ALLERGIES:  Allergies    Allergy Status Unknown    Intolerances        LABS:                        8.7    17.74 )-----------( 202      ( 10 Mar 2023 06:12 )             27.9     03-10    140  |  109<H>  |  24<H>  ----------------------------<  101<H>  3.9   |  24  |  1.13    Ca    8.7      10 Mar 2023 06:12  Phos  3.5     03-10  Mg     2.2     03-10    TPro  6.7  /  Alb  2.9<L>  /  TBili  0.4  /  DBili  x   /  AST  33  /  ALT  35  /  AlkPhos  61  03-10        CAPILLARY BLOOD GLUCOSE      POCT Blood Glucose.: 91 mg/dL (09 Mar 2023 16:16)      RADIOLOGY & ADDITIONAL TESTS: Reviewed.

## 2023-03-10 NOTE — PROGRESS NOTE ADULT - ASSESSMENT
54yo M with unknown PMH, undomiciled, presented to [facility] by EMS after being found on the ground. Arrived to ED with AMS, severe anemia, metabolic acidosis. Intubated for airway protection at [facility]. Now extubated and on room air. Was on pressors for hypovolemic vs septic shock, now off pressors. C/w antibiotics for pna vs aspiration pna. 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 Now with behavioral issues, will attempt to wean precedex.      NEURO  #Seizure  No known past seizure hx but with witnessed seizure at [facility] w/ left eye deviation during seizure. S/p Keppra loading dose  CT head wnl. Utox neg.   - vEEG reports 3/8: Moderate generalized slowing suggestive of a moderate degree of diffuse or multifocal dysfunction. Some improvement in background slowing from previous day.   - ativan 2mg prn for seizure activity >2min    #?Psych history  Patient unable to report medical history. More coherent today. Does not appear actively psychotic.  - haldol 5 IV/IM prn for agitation  - psych consulted, f/u recs  - stop precedex  - SW regarding discharge planning    CV  #Left bundle branch block  Left bundle of unknown chronicity. Upsloping T waves in V2/V3. Trop I wnl.  No clinical signs of HF, BNP 3100. Likely w/ some component of demand ischemia.  - F/u TTE  - Trop's plateaued  - Repeat ekg when patient not agitated    #Hypovolemic shock- Resolved  Likely 2/2 to bird mites sucking blood causing anemia of hemoglobin of 2.2. Received fluid resuscitation + 7 u pRBC. Now w/ hemoglobin rang of 7-8, not requiring any pressors.     PULM  PIA    RENAL  #MEERA  Cr 2.03 on admission (3/6). Likely pre-renal MEERA.. Unknown baseline Cr.   I/s/o severe sepsis, Cr improving w/ treatment of sepsis.  - Cr continuing to downtrend  - continue to trend creatinine    HEME  #Normocytic anemia  Likely acute on chronic. P/w Hgb 2.2 and hypoproliferative retic index on admission. No signs of bleeding. No known malignancies. MCV normocytic so unlikely to have thalassemia. normal hemolysis labs.   S/p 7u pRBC.   Highest on differential for anemia is bird mites biting and sucking on blood.  - active T&S  - transfuse for hgb <7    SKIN  #Bird mites   came to [facility] and identified bugs as "bird mites". He was decontaminated at [facility]. Bugs seen on patient and sheets at [facility] ICU (3/7) , and within vEEG dressing.  - s/p permethrin wash and shaving   - decontaminated multiple times, last on 3/7 and no bugs seen last few days, as per Epidemiology can now take off isolation precautions    ENDO  - q4h fingersticks  - TSH 2.64    ID  #Severe sepsis- resolving  #PNA  On admission hypothermic with leukocytosis, on ursula hugger and required right femoral warming catheter. Hypothermia likely 2/2 environment and infection. CT A/P w/ complete consolidation of visualized RLL, likely source of sepsis  - c/w Zosyn at pseudomonal dosing 4.5mg Q8Hrs (3/6- )   - s/p Vanc 1g Q24Hrs (3/6-3/7)  - MRSA swab w/ +MSSA  - f/u BCx  - sputum w/ rare gram pos rods  - c/w Zosyn 4.5mg TID (3/6-)       Preventive Measures:   F: s/p 7u prbc  E: replenish as appropriate  N: Regular  VTE Prophylaxis: Heparin 5000 TID  GI: PPI BID  C: Full Code  D: MICU

## 2023-03-10 NOTE — PROGRESS NOTE ADULT - CRITICAL CARE ATTENDING COMMENT
Events well known to me;  Marked agitation and abuse to staff   Will transfer to regional   Not competent to make decisions

## 2023-03-11 DIAGNOSIS — B88.9 INFESTATION, UNSPECIFIED: ICD-10-CM

## 2023-03-11 DIAGNOSIS — Z29.9 ENCOUNTER FOR PROPHYLACTIC MEASURES, UNSPECIFIED: ICD-10-CM

## 2023-03-11 DIAGNOSIS — J69.0 PNEUMONITIS DUE TO INHALATION OF FOOD AND VOMIT: ICD-10-CM

## 2023-03-11 DIAGNOSIS — R56.9 UNSPECIFIED CONVULSIONS: ICD-10-CM

## 2023-03-11 DIAGNOSIS — N17.9 ACUTE KIDNEY FAILURE, UNSPECIFIED: ICD-10-CM

## 2023-03-11 DIAGNOSIS — R45.1 RESTLESSNESS AND AGITATION: ICD-10-CM

## 2023-03-11 DIAGNOSIS — I44.7 LEFT BUNDLE-BRANCH BLOCK, UNSPECIFIED: ICD-10-CM

## 2023-03-11 DIAGNOSIS — D64.9 ANEMIA, UNSPECIFIED: ICD-10-CM

## 2023-03-11 LAB
FOLATE SERPL-MCNC: 16.6 NG/ML — SIGNIFICANT CHANGE UP
VIT B12 SERPL-MCNC: 623 PG/ML — SIGNIFICANT CHANGE UP (ref 232–1245)

## 2023-03-11 PROCEDURE — 99232 SBSQ HOSP IP/OBS MODERATE 35: CPT

## 2023-03-11 PROCEDURE — 99233 SBSQ HOSP IP/OBS HIGH 50: CPT | Mod: GC

## 2023-03-11 RX ORDER — FOLIC ACID 0.8 MG
1 TABLET ORAL DAILY
Refills: 0 | Status: DISCONTINUED | OUTPATIENT
Start: 2023-03-11 | End: 2023-03-14

## 2023-03-11 RX ORDER — ACETAMINOPHEN 500 MG
650 TABLET ORAL EVERY 6 HOURS
Refills: 0 | Status: DISCONTINUED | OUTPATIENT
Start: 2023-03-11 | End: 2023-03-14

## 2023-03-11 RX ADMIN — Medication 200 MILLIGRAM(S): at 18:06

## 2023-03-11 RX ADMIN — HALOPERIDOL DECANOATE 5 MILLIGRAM(S): 100 INJECTION INTRAMUSCULAR at 18:07

## 2023-03-11 RX ADMIN — Medication 650 MILLIGRAM(S): at 12:23

## 2023-03-11 RX ADMIN — DIVALPROEX SODIUM 500 MILLIGRAM(S): 500 TABLET, DELAYED RELEASE ORAL at 06:14

## 2023-03-11 RX ADMIN — HEPARIN SODIUM 5000 UNIT(S): 5000 INJECTION INTRAVENOUS; SUBCUTANEOUS at 23:51

## 2023-03-11 RX ADMIN — CHLORHEXIDINE GLUCONATE 1 APPLICATION(S): 213 SOLUTION TOPICAL at 07:09

## 2023-03-11 RX ADMIN — DIVALPROEX SODIUM 500 MILLIGRAM(S): 500 TABLET, DELAYED RELEASE ORAL at 18:06

## 2023-03-11 RX ADMIN — Medication 650 MILLIGRAM(S): at 13:23

## 2023-03-11 RX ADMIN — HALOPERIDOL DECANOATE 5 MILLIGRAM(S): 100 INJECTION INTRAMUSCULAR at 11:45

## 2023-03-11 RX ADMIN — Medication 200 MILLIGRAM(S): at 06:14

## 2023-03-11 RX ADMIN — Medication 50 MILLIGRAM(S): at 23:50

## 2023-03-11 RX ADMIN — HALOPERIDOL DECANOATE 5 MILLIGRAM(S): 100 INJECTION INTRAMUSCULAR at 23:50

## 2023-03-11 NOTE — PROGRESS NOTE ADULT - ASSESSMENT
54yo M with unknown PMH, undomiciled, presented to [facility] by EMS after being found on the ground. Arrived to ED with AMS, severe anemia, metabolic acidosis. Intubated for airway protection at [facility]. Now extubated and on room air. Was on pressors for hypovolemic vs septic shock, now off pressors. C/w antibiotics for pna vs aspiration pna. 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 Now with behavioral issues, will attempt to wean precedex.      NEURO  #Seizure  No known past seizure hx but with witnessed seizure at [facility] w/ left eye deviation during seizure. S/p Keppra loading dose  CT head wnl. Utox neg.   - vEEG reports 3/8: Moderate generalized slowing suggestive of a moderate degree of diffuse or multifocal dysfunction. Some improvement in background slowing from previous day.   - ativan 2mg prn for seizure activity >2min    #?Psych history  Patient unable to report medical history. More coherent today. Does not appear actively psychotic.  - haldol 5 IV/IM prn for agitation  - psych consulted, f/u recs  - stop precedex  - SW regarding discharge planning    CV  #Left bundle branch block  Left bundle of unknown chronicity. Upsloping T waves in V2/V3. Trop I wnl.  No clinical signs of HF, BNP 3100. Likely w/ some component of demand ischemia.  - F/u TTE  - Trop's plateaued  - Repeat ekg when patient not agitated    #Hypovolemic shock- Resolved  Likely 2/2 to bird mites sucking blood causing anemia of hemoglobin of 2.2. Received fluid resuscitation + 7 u pRBC. Now w/ hemoglobin rang of 7-8, not requiring any pressors.     PULM  PIA    RENAL  #MEERA  Cr 2.03 on admission (3/6). Likely pre-renal MEERA.. Unknown baseline Cr.   I/s/o severe sepsis, Cr improving w/ treatment of sepsis.  - Cr continuing to downtrend  - continue to trend creatinine    HEME  #Normocytic anemia  Likely acute on chronic. P/w Hgb 2.2 and hypoproliferative retic index on admission. No signs of bleeding. No known malignancies. MCV normocytic so unlikely to have thalassemia. normal hemolysis labs.   S/p 7u pRBC.   Highest on differential for anemia is bird mites biting and sucking on blood.  - active T&S  - transfuse for hgb <7    SKIN  #Bird mites   came to [facility] and identified bugs as "bird mites". He was decontaminated at [facility]. Bugs seen on patient and sheets at [facility] ICU (3/7) , and within vEEG dressing.  - s/p permethrin wash and shaving   - decontaminated multiple times, last on 3/7 and no bugs seen last few days, as per Epidemiology can now take off isolation precautions    ENDO  - q4h fingersticks  - TSH 2.64    ID  #Severe sepsis- resolving  #PNA  On admission hypothermic with leukocytosis, on ursula hugger and required right femoral warming catheter. Hypothermia likely 2/2 environment and infection. CT A/P w/ complete consolidation of visualized RLL, likely source of sepsis  - c/w Zosyn at pseudomonal dosing 4.5mg Q8Hrs (3/6- )   - s/p Vanc 1g Q24Hrs (3/6-3/7)  - MRSA swab w/ +MSSA  - f/u BCx  - sputum w/ rare gram pos rods  - c/w Zosyn 4.5mg TID (3/6-)       Preventive Measures:   F: s/p 7u prbc  E: replenish as appropriate  N: Regular  VTE Prophylaxis: Heparin 5000 TID  GI: PPI BID  C: Full Code  D: MICU

## 2023-03-11 NOTE — PROGRESS NOTE ADULT - ASSESSMENT
54 y/o undomiciled M with unknown PMHx who presented from [facility] after being found down, admitted to MICU for hemorrhagic shock 2/2 bird mites and metabolic acidosis, requiring intubation for airway protection. Now extubated and stepped down to [facility] for further psychiatric workup.

## 2023-03-11 NOTE — PROGRESS NOTE ADULT - PROBLEM SELECTOR PLAN 2
Patient with severe episodes of agitation. Psych assessed patient - no clear psychiatric diagnoses at this time, but recommend the following:   --1:1 for elopement precautions and unpredictability  --NOT psychiatrically cleared for discharge at this time; psych will continue to consider need for inpatient admission to psychiatry   --Continue Depakote 500mg bid standing for mood instability  --Continue Haldol 5mg PO/IV tid for agitation, concern for psychosis  --Continue to monitor EKG for QTc; hold Haldol for QTc >500ms  --Haldol 5mg PO/IV/IM q6hr prn agitation  --Ativan 2mg PO/IV/IM q6hr agitation 2nd line  --Trazodone 50mg qhs insomnia

## 2023-03-11 NOTE — PROGRESS NOTE ADULT - SUBJECTIVE AND OBJECTIVE BOX
TRANSFER ACCEPTANCE NOTE [address] TO [address] [place] [address] COURSE:   52yo M with unknown PMH, undomiciled, presented to [facility] by EMS after being found on the ground. Arrived to ED with AMS, severe anemia, metabolic acidosis. Had a witnessed seizure at [facility] and was keppra loaded; Intubated for airway protection at [facility]. At [facility], started on pressors for hypovolemic (hemorrhagic) vs septic shock. 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 Was on zosyn to cover for PNA (CT A/P w/ complete consolidation of the visualized right lower lobe), now transitioned to cefpodoxime to complete course. Decontaminated multiple times with permethrin and now can take off isolation precautions as per Epidemiology. Shock now resolved. Psych following for agitation. Was initially on precedex gtt, now weaned off, will c/w haldol prn. Pending further Psych evaluation.    OVERNIGHT EVENTS:     SUBJECTIVE / INTERVAL HPI: Patient seen and examined at bedside.     VITAL SIGNS:  Vital Signs Last 24 Hrs  T(C): 37.2 (11 Mar 2023 11:00), Max: 37.2 (11 Mar 2023 11:00)  T(F): 99 (11 Mar 2023 11:00), Max: 99 (11 Mar 2023 11:00)  HR: 88 (11 Mar 2023 11:00) (88 - 99)  BP: 133/78 (11 Mar 2023 11:00) (133/78 - 138/83)  BP(mean): 106 (11 Mar 2023 05:00) (106 - 106)  RR: 20 (11 Mar 2023 11:00) (20 - 34)  SpO2: 95% (11 Mar 2023 11:00) (95% - 96%)    Parameters below as of 11 Mar 2023 11:00  Patient On (Oxygen Delivery Method): room air        03-10-23 @ 07:01  -  03-11-23 @ 07:00  --------------------------------------------------------  IN: 0 mL / OUT: 200 mL / NET: -200 mL    03-11-23 @ 06:01  -  03-11-23 @ 15:56  --------------------------------------------------------  IN: 0 mL / OUT: 300 mL / NET: -300 mL        PHYSICAL EXAM:    General: WDWN  HEENT: NC/AT; PERRL, anicteric sclera; MMM  Neck: supple  Cardiovascular: +S1/S2; RRR  Respiratory: CTA B/L; no W/R/R  Gastrointestinal: soft, NT/ND; +BSx4  Extremities: WWP; no edema, clubbing or cyanosis  Vascular: 2+ radial, DP/PT pulses B/L  Neurological: AAOx3; no focal deficits    MEDICATIONS:  MEDICATIONS  (STANDING):  cefpodoxime 200 milliGRAM(s) Oral every 12 hours  dextrose 5%. 1000 milliLiter(s) (100 mL/Hr) IV Continuous <Continuous>  dextrose 5%. 1000 milliLiter(s) (50 mL/Hr) IV Continuous <Continuous>  dextrose 50% Injectable 25 Gram(s) IV Push once  dextrose 50% Injectable 12.5 Gram(s) IV Push once  dextrose 50% Injectable 25 Gram(s) IV Push once  divalproex  milliGRAM(s) Oral every 12 hours  folic acid 1 milliGRAM(s) Oral daily  glucagon  Injectable 1 milliGRAM(s) IntraMuscular once  haloperidol    Injectable 5 milliGRAM(s) IntraMuscular every 6 hours  heparin   Injectable 5000 Unit(s) SubCutaneous every 8 hours  insulin lispro (ADMELOG) corrective regimen sliding scale   SubCutaneous Before meals and at bedtime  traZODone 50 milliGRAM(s) Oral at bedtime    MEDICATIONS  (PRN):  acetaminophen     Tablet .. 650 milliGRAM(s) Oral every 6 hours PRN Temp greater or equal to 38C (100.4F), Mild Pain (1 - 3)  dextrose Oral Gel 15 Gram(s) Oral once PRN Blood Glucose LESS THAN 70 milliGRAM(s)/deciliter  haloperidol    Injectable 2.5 milliGRAM(s) IntraMuscular every 3 hours PRN Agitation      ALLERGIES:  Allergies    Allergy Status Unknown    Intolerances        LABS:                        8.7    17.74 )-----------( 202      ( 10 Mar 2023 06:12 )             27.9     03-10    140  |  109<H>  |  24<H>  ----------------------------<  101<H>  3.9   |  24  |  1.13    Ca    8.7      10 Mar 2023 06:12  Phos  3.5     03-10  Mg     2.2     03-10    TPro  6.7  /  Alb  2.9<L>  /  TBili  0.4  /  DBili  x   /  AST  33  /  ALT  35  /  AlkPhos  61  03-10        CAPILLARY BLOOD GLUCOSE      POCT Blood Glucose.: 91 mg/dL (09 Mar 2023 16:16)        RADIOLOGY & ADDITIONAL TESTS: Reviewed.    ASSESSMENT:    PLAN:  TRANSFER ACCEPTANCE NOTE 7EAST TO [address] [place] [address] COURSE:   54yo M with unknown PMH, undomiciled, presented to [facility] by EMS after being found on the ground. Arrived to ED with AMS, severe anemia, metabolic acidosis. Had a witnessed seizure at [facility] and was keppra loaded; Intubated for airway protection at [facility]. At [facility], started on pressors for hypovolemic (hemorrhagic) vs septic shock. 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 Was on zosyn to cover for PNA (CT A/P w/ complete consolidation of the visualized right lower lobe), now transitioned to cefpodoxime to complete course. Decontaminated multiple times with permethrin and now can take off isolation precautions as per Epidemiology. Shock now resolved. Psych following for agitation. Was initially on precedex gtt, now weaned off, will c/w haldol prn. Stable for stepdown to [facility].     OVERNIGHT EVENTS: SARAH    SUBJECTIVE / INTERVAL HPI: Patient seen and examined at bedside. Has non-linear thinking, including making statements about "being tortured" in [facility] and is focused on getting back to [facility]. Requesting transportation to [facility]. He is also asking for shelter placement in [facility]. Admits to some pain in the lower legs that started after he was admitted. Feels that he cannot walk properly, has not tried to walk. Denies any chest pain or shortness of breath. States his name is "Isaiah Ramos".     VITAL SIGNS:  Vital Signs Last 24 Hrs  T(C): 37.2 (11 Mar 2023 11:00), Max: 37.2 (11 Mar 2023 11:00)  T(F): 99 (11 Mar 2023 11:00), Max: 99 (11 Mar 2023 11:00)  HR: 88 (11 Mar 2023 11:00) (88 - 99)  BP: 133/78 (11 Mar 2023 11:00) (133/78 - 138/83)  BP(mean): 106 (11 Mar 2023 05:00) (106 - 106)  RR: 20 (11 Mar 2023 11:00) (20 - 34)  SpO2: 95% (11 Mar 2023 11:00) (95% - 96%)    Parameters below as of 11 Mar 2023 11:00  Patient On (Oxygen Delivery Method): room air      03-10-23 @ 07:01  -  03-11-23 @ 07:00  --------------------------------------------------------  IN: 0 mL / OUT: 200 mL / NET: -200 mL    03-11-23 @ 06:01  -  03-11-23 @ 15:56  --------------------------------------------------------  IN: 0 mL / OUT: 300 mL / NET: -300 mL      PHYSICAL EXAM:    General: thin male, calm, resting in bed in NAD   HEENT: EOMI, PERRL, clear conjunctiva, no nasal or oropharyngeal discharge or exudates, MMM  Neck: supple, no cervical or post-auricular lymphadenopathy  Cardiovascular: +S1/S2, no murmurs/rubs/gallops, RRR  Respiratory: CTA bilaterally,  no wheezes/rales/rhonchi  Gastrointestinal: soft, slightly distended but nontender, normoactive bowel sounds   Genitourinary: no suprapubic tenderness, no CVA tenderness  Extremities: WWP; no edema, clubbing or cyanosis. 2-3 scabs on LLE.   Vascular: 2+ radial, DP/PT pulses B/L  Skin: Scabs on LLE as above, finger tips are markedly soiled and crusted.   Neurological: AAOx3 (to self, place, time), no focal deficits  Psych: no pressured speech, but often has tangential thinking.     MEDICATIONS:  MEDICATIONS  (STANDING):  cefpodoxime 200 milliGRAM(s) Oral every 12 hours  dextrose 5%. 1000 milliLiter(s) (100 mL/Hr) IV Continuous <Continuous>  dextrose 5%. 1000 milliLiter(s) (50 mL/Hr) IV Continuous <Continuous>  dextrose 50% Injectable 25 Gram(s) IV Push once  dextrose 50% Injectable 12.5 Gram(s) IV Push once  dextrose 50% Injectable 25 Gram(s) IV Push once  divalproex  milliGRAM(s) Oral every 12 hours  folic acid 1 milliGRAM(s) Oral daily  glucagon  Injectable 1 milliGRAM(s) IntraMuscular once  haloperidol    Injectable 5 milliGRAM(s) IntraMuscular every 6 hours  heparin   Injectable 5000 Unit(s) SubCutaneous every 8 hours  insulin lispro (ADMELOG) corrective regimen sliding scale   SubCutaneous Before meals and at bedtime  traZODone 50 milliGRAM(s) Oral at bedtime    MEDICATIONS  (PRN):  acetaminophen     Tablet .. 650 milliGRAM(s) Oral every 6 hours PRN Temp greater or equal to 38C (100.4F), Mild Pain (1 - 3)  dextrose Oral Gel 15 Gram(s) Oral once PRN Blood Glucose LESS THAN 70 milliGRAM(s)/deciliter  haloperidol    Injectable 2.5 milliGRAM(s) IntraMuscular every 3 hours PRN Agitation      ALLERGIES:  Allergies    Allergy Status Unknown    Intolerances        LABS:                        8.7    17.74 )-----------( 202      ( 10 Mar 2023 06:12 )             27.9     03-10    140  |  109<H>  |  24<H>  ----------------------------<  101<H>  3.9   |  24  |  1.13    Ca    8.7      10 Mar 2023 06:12  Phos  3.5     03-10  Mg     2.2     03-10    TPro  6.7  /  Alb  2.9<L>  /  TBili  0.4  /  DBili  x   /  AST  33  /  ALT  35  /  AlkPhos  61  03-10        CAPILLARY BLOOD GLUCOSE      POCT Blood Glucose.: 91 mg/dL (09 Mar 2023 16:16)        RADIOLOGY & ADDITIONAL TESTS: Reviewed.    ASSESSMENT:    PLAN:

## 2023-03-11 NOTE — PROGRESS NOTE ADULT - SUBJECTIVE AND OBJECTIVE BOX
***STEPDOWN FROM MICU to [facility]***  Hospital Course: 52yo M with unknown PMH, undomiciled, presented to [facility] by EMS after being found on the ground. Arrived to ED with AMS, severe anemia, metabolic acidosis. Had a witnessed seizure at [facility] and was keppra loaded; Intubated for airway protection at [facility]. At [facility], started on pressors for hypovolemic (hemorrhagic) vs septic shock. 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 Was on zosyn to cover for PNA (CT A/P w/ complete consolidation of the visualized right lower lobe), now transitioned to cefpodoxime to complete course. Decontaminated multiple times with permethrin and now can take off isolation precautions as per Epidemiology. Shock now resolved. Psych following for agitation. Was initially on precedex gtt, now weaned off, will c/w haldol prn. Pending further Psych evaluation.      INTERVAL HPI/OVERNIGHT EVENTS: As per overnight team ___. Patient seen and examinated at bedside.     ICU Vital Signs Last 24 Hrs  T(C): 36.7 (11 Mar 2023 05:23), Max: 36.7 (11 Mar 2023 01:04)  T(F): 98.1 (11 Mar 2023 05:23), Max: 98.1 (11 Mar 2023 01:04)  HR: 89 (11 Mar 2023 05:00) (49 - 99)  BP: 138/83 (11 Mar 2023 05:00) (118/86 - 138/83)  BP(mean): 106 (11 Mar 2023 05:00) (99 - 106)  ABP: --  ABP(mean): --  RR: 30 (11 Mar 2023 05:00) (25 - 34)  SpO2: 96% (11 Mar 2023 05:00) (88% - 97%)    O2 Parameters below as of 11 Mar 2023 05:00  Patient On (Oxygen Delivery Method): room air          I&O's Summary    09 Mar 2023 07:01  -  10 Mar 2023 07:00  --------------------------------------------------------  IN: 446.3 mL / OUT: 275 mL / NET: 171.3 mL    10 Mar 2023 07:01  -  11 Mar 2023 06:31  --------------------------------------------------------  IN: 0 mL / OUT: 200 mL / NET: -200 mL          LABS:                        8.7    17.74 )-----------( 202      ( 10 Mar 2023 06:12 )             27.9     03-10    140  |  109<H>  |  24<H>  ----------------------------<  101<H>  3.9   |  24  |  1.13    Ca    8.7      10 Mar 2023 06:12  Phos  3.5     03-10  Mg     2.2     03-10    TPro  6.7  /  Alb  2.9<L>  /  TBili  0.4  /  DBili  x   /  AST  33  /  ALT  35  /  AlkPhos  61  03-10        CAPILLARY BLOOD GLUCOSE            RADIOLOGY & ADDITIONAL TESTS:    Consultant(s) Notes Reviewed:  [x ] YES  [ ] NO    MEDICATIONS  (STANDING):  cefpodoxime 200 milliGRAM(s) Oral every 12 hours  chlorhexidine 2% Cloths 1 Application(s) Topical <User Schedule>  dextrose 5%. 1000 milliLiter(s) (100 mL/Hr) IV Continuous <Continuous>  dextrose 5%. 1000 milliLiter(s) (50 mL/Hr) IV Continuous <Continuous>  dextrose 50% Injectable 25 Gram(s) IV Push once  dextrose 50% Injectable 12.5 Gram(s) IV Push once  dextrose 50% Injectable 25 Gram(s) IV Push once  divalproex  milliGRAM(s) Oral every 12 hours  folic acid Injectable 1 milliGRAM(s) IV Push daily  glucagon  Injectable 1 milliGRAM(s) IntraMuscular once  haloperidol    Injectable 5 milliGRAM(s) IntraMuscular every 6 hours  heparin   Injectable 5000 Unit(s) SubCutaneous every 8 hours  insulin lispro (ADMELOG) corrective regimen sliding scale   SubCutaneous Before meals and at bedtime  traZODone 50 milliGRAM(s) Oral at bedtime    MEDICATIONS  (PRN):  dextrose Oral Gel 15 Gram(s) Oral once PRN Blood Glucose LESS THAN 70 milliGRAM(s)/deciliter  haloperidol    Injectable 2.5 milliGRAM(s) IntraMuscular every 3 hours PRN Agitation      PHYSICAL EXAM  General: Intubated and sendated, Comfortable, pleasant/anxious/agitated, Ill-appearing, well-nourished/frail/cachectic, comfortable / in distress  HEENT: NC/AT; EOMI, PERRL, clear conjunctiva, no nasal or oropharyngeal discharge or exudates, MMM  Neck: supple, no cervical or post-auricular lymphadenopathy  Cardiovascular: +S1/S2, no murmurs/rubs/gallops, RRR  Respiratory: CTA bilaterally,  no wheezes/rales/rhonchi, no increased work of breathing or accessory muscle use  Gastrointestinal: soft, NT/ND; active bowel sounds  Genitourinary: no suprapubic tenderness, no CVA tenderness  Extremities: WWP; no edema, clubbing or cyanosis  Vascular: 2+ radial, DP/PT pulses B/L  Skin: no rashes  Neurological: AAOx3 (to self, place, time), no focal deficits  Lines/Drains:

## 2023-03-11 NOTE — PROGRESS NOTE ADULT - PROBLEM SELECTOR PLAN 1
On admission hypothermic with leukocytosis, on ursula hugger and required right femoral warming catheter. Hypothermia likely 2/2 environment and infection. CT A/P w/ complete consolidation of visualized RLL, likely source of sepsis. MRSA negative, MSSA positive. Was on Zosyn but as patient was immediately removing IVs after they were placed, decision was made to change abx to PO.   - Cefpodoxime 200mg BID - end on 3/12  - Pending CT scan non con, however patient is deferring at this time. Will reattempt to do it on 3/12

## 2023-03-12 LAB
CULTURE RESULTS: SIGNIFICANT CHANGE UP
GLUCOSE BLDC GLUCOMTR-MCNC: 90 MG/DL — SIGNIFICANT CHANGE UP (ref 70–99)
SPECIMEN SOURCE: SIGNIFICANT CHANGE UP

## 2023-03-12 PROCEDURE — 99232 SBSQ HOSP IP/OBS MODERATE 35: CPT

## 2023-03-12 PROCEDURE — 99233 SBSQ HOSP IP/OBS HIGH 50: CPT

## 2023-03-12 RX ORDER — CEFPODOXIME PROXETIL 100 MG
1 TABLET ORAL
Qty: 10 | Refills: 0
Start: 2023-03-12 | End: 2023-03-16

## 2023-03-12 RX ORDER — DIVALPROEX SODIUM 500 MG/1
1 TABLET, DELAYED RELEASE ORAL
Qty: 28 | Refills: 0
Start: 2023-03-12 | End: 2023-03-25

## 2023-03-12 RX ADMIN — HALOPERIDOL DECANOATE 5 MILLIGRAM(S): 100 INJECTION INTRAMUSCULAR at 18:00

## 2023-03-12 RX ADMIN — HALOPERIDOL DECANOATE 5 MILLIGRAM(S): 100 INJECTION INTRAMUSCULAR at 06:58

## 2023-03-12 RX ADMIN — HALOPERIDOL DECANOATE 5 MILLIGRAM(S): 100 INJECTION INTRAMUSCULAR at 23:25

## 2023-03-12 RX ADMIN — HEPARIN SODIUM 5000 UNIT(S): 5000 INJECTION INTRAVENOUS; SUBCUTANEOUS at 15:00

## 2023-03-12 RX ADMIN — HEPARIN SODIUM 5000 UNIT(S): 5000 INJECTION INTRAVENOUS; SUBCUTANEOUS at 23:24

## 2023-03-12 RX ADMIN — Medication 1 MILLIGRAM(S): at 12:00

## 2023-03-12 RX ADMIN — HALOPERIDOL DECANOATE 5 MILLIGRAM(S): 100 INJECTION INTRAMUSCULAR at 12:00

## 2023-03-12 RX ADMIN — DIVALPROEX SODIUM 500 MILLIGRAM(S): 500 TABLET, DELAYED RELEASE ORAL at 06:58

## 2023-03-12 RX ADMIN — DIVALPROEX SODIUM 500 MILLIGRAM(S): 500 TABLET, DELAYED RELEASE ORAL at 18:00

## 2023-03-12 RX ADMIN — Medication 50 MILLIGRAM(S): at 23:24

## 2023-03-12 RX ADMIN — Medication 200 MILLIGRAM(S): at 06:58

## 2023-03-12 RX ADMIN — HEPARIN SODIUM 5000 UNIT(S): 5000 INJECTION INTRAVENOUS; SUBCUTANEOUS at 06:58

## 2023-03-12 NOTE — BH CONSULTATION LIAISON PROGRESS NOTE - NSBHFUPINTERVALCCFT_PSY_A_CORE
"I just want to go back to [facility]"
"I just want to go back to [facility]"
complains of pain/discomfort
Pt with an episode of acute agitation at 9 am. Pt was refusing assessment, urinating on floor, defecating on floor, spitting at staff, verbally abusing staff, pulled out IV, pulled off telemetry leads, pt verbalized " I will throw myself on the floor"  Security presence was required. Security,  KiritDr. Briseno, CAROLS [place], Paul Rosas at bedside to discuss action/treatment plan

## 2023-03-12 NOTE — PROGRESS NOTE ADULT - ASSESSMENT
54 y/o undomiciled M with unknown PMHx who presented from [facility] after being found down, admitted to MICU for hemorrhagic shock 2/2 bird mites and metabolic acidosis, requiring intubation for airway protection. Now extubated and stepped down to [facility] for further psychiatric workup.  52 y/o undomiciled M with unknown PMHx who presented from [facility] after being found down, admitted to MICU for hemorrhagic shock 2/2 bird mites and metabolic acidosis, requiring intubation for airway protection. Now extubated and stepped down to [facility] now medically ready for d/c pending shelter packet placement, psychiatrically cleared for d/c.   - Medically ready for D/c  - no indication for PT as patient was seen by nurses climbing on bed and bed surfing and also getting out of bed to defecate on the floor in front of the medical team and walking around his room without assistance.  - psychiatrically no barriers to d/c

## 2023-03-12 NOTE — DISCHARGE NOTE PROVIDER - ATTENDING DISCHARGE PHYSICAL EXAMINATION:
-Gen: NAD, resting in chair  -HEENT: EOMI, PERRL, no scleral icterus, no JVD  -CV: normal S1 and S2  -Lungs: CTABL, normal respiratory effort on RA  -Ab: soft, NT, ND, normal BS  -Ext: no LE edema  -Neuro: A&O x 3, no focal deficits

## 2023-03-12 NOTE — DISCHARGE NOTE PROVIDER - HOSPITAL COURSE
HOSPITAL COURSE:   54yo M with unknown PMH, undomiciled, presented to [facility] by EMS after being found on the ground. Arrived to ED with AMS, severe anemia, metabolic acidosis. Had a witnessed seizure at [facility] and was keppra loaded; Intubated for airway protection at [facility]. At [facility], started on pressors for hypovolemic (hemorrhagic) vs septic shock. 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 Was on zosyn to cover for PNA (CT A/P w/ complete consolidation of the visualized right lower lobe), now transitioned to cefpodoxime to complete course. Decontaminated multiple times with permethrin and now can take off isolation precautions as per Epidemiology. Shock now resolved. Psych following for agitation. Was initially on precedex gtt, now weaned off, will c/w haldol prn. Stable for stepdown to [facility]. On 3/12 cleared by psych for discharge, HD stable, medically clear for discharge on cefpodoxime for a total of 10 day course (3/6-3/16).    Problem/Plan - 1:  ·  Problem: Pneumonia, aspiration.  ·  Plan: On admission hypothermic with leukocytosis, on ursula hugger and required right femoral warming catheter. Hypothermia likely 2/2 environment and infection. CT A/P w/ complete consolidation of visualized RLL, likely source of sepsis. MRSA negative, MSSA positive. Was on Zosyn but as patient was immediately removing IVs after they were placed, decision was made to change abx to PO.   - Cefpodoxime 200mg BID - end on 3/16 for ten day total course.   - Patietn does nto require PT as     Problem/Plan - 2:  ·  Problem: Agitation.   ·  Plan: Patient with severe episodes of agitation. Psych assessed patient - no clear psychiatric diagnoses at this time, but recommend the following:   --Can continue Depakote 500 mg bid for mood instability upon discharge  --Pt can follow-up at [facility] walk-in behavioral health clinic upon discharge.  [facility] walk-in Behavioral Health clinic  [address] [place] [id number]  Monday 3/13/23 at 9AM; please bring insurance card and ID    Problem/Plan - 3:  ·  Problem: Seizure.   ·  Plan: No known past seizure hx but with witnessed seizure at [facility] w/ left eye deviation during seizure. S/p Keppra loading dose. CT head wnl. Utox neg.   - vEEG reports 3/8: Moderate generalized slowing suggestive of a moderate degree of diffuse or multifocal dysfunction. Some improvement in background slowing from previous day.     Problem/Plan - 4:  ·  Problem: Left bundle branch block.   ·  Plan: Left bundle of unknown chronicity. Upsloping T waves in V2/V3. Trop I wnl.  No clinical signs of HF, BNP 3100. Likely w/ some component of demand ischemia.  - Troponins plateaued   - TTE not able to be performed as patient was agitated, and is currently declining it.    Problem/Plan - 5:  ·  Problem: MEERA (acute kidney injury).   ·  Plan: Cr 2.03 on admission (3/6). Likely pre-renal MEERA.. Unknown baseline Cr.   I/s/o severe sepsis, Cr improving w/ treatment of sepsis.  - Cr continuing to downtrend  - continue to trend creatinine.    Problem/Plan - 6:  ·  Problem: Normocytic anemia.   ·  Plan: Likely acute on chronic. P/w Hgb 2.2 and hypoproliferative retic index on admission. No signs of bleeding. No known malignancies. MCV normocytic so unlikely to have thalassemia. normal hemolysis labs.   S/p 7u pRBC.   Highest on differential for anemia is bird mites biting and sucking on blood.  - active T&S  - transfuse for hgb <7.    Problem/Plan - 7:  ·  Problem: Infestation by mites.   ·  Plan:  came to [facility] and identified bugs as "bird mites". He was decontaminated at [facility]. Bugs seen on patient and sheets at [facility] ICU (3/7) , and within vEEG dressing.  - s/p permethrin wash and shaving   - decontaminated multiple times, last on 3/7 and no bugs seen last few days, as per Epidemiology can now take off isolation precautions.    New Medications: Cefpodoxime  Previous MEdications:      HOSPITAL COURSE:   52yo M with unknown PMH, undomiciled, presented to [facility] by EMS after being found on the ground. Arrived to ED with AMS, severe anemia, metabolic acidosis. Had a witnessed seizure at [facility] and was keppra loaded; Intubated for airway protection at [facility]. At [facility], started on pressors for hypovolemic (hemorrhagic) vs septic shock. 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 Was on zosyn to cover for PNA (CT A/P w/ complete consolidation of the visualized right lower lobe), now transitioned to cefpodoxime to complete course. Decontaminated multiple times with permethrin and now can take off isolation precautions as per Epidemiology. Shock now resolved. Psych following for agitation. Was initially on precedex gtt, now weaned off, will c/w haldol prn. Stable for stepdown to [facility]. On 3/12 cleared by psych for discharge, HD stable, medically clear for discharge on cefpodoxime for a total of 10 day course (3/6-3/16).    Problem/Plan - 1:  ·  Problem: Pneumonia, aspiration.  ·  Plan: On admission hypothermic with leukocytosis, on ursula hugger and required right femoral warming catheter. Hypothermia likely 2/2 environment and infection. CT A/P w/ complete consolidation of visualized RLL, likely source of sepsis. MRSA negative, MSSA positive. Was on Zosyn but as patient was immediately removing IVs after they were placed, decision was made to change abx to PO.   - Cefpodoxime 200mg BID - end on 3/14 for seven day total course.   - Patient does not require PT as seen in documentation; he has been observed multiple times by nurses climbing on bed and bed surfing and also getting out of bed to defecate on the floor in front of the medical team and walking around his room without assistance. Also seen by intern repeatedly walking around room unassisted and standing for several minutes unsupported in doorway and hallway.     Problem/Plan - 2:  ·  Problem: Agitation.   ·  Plan: Patient with severe episodes of agitation. Psych assessed patient - no clear psychiatric diagnoses at this time, but recommend the following:   --Can continue Depakote 500 mg bid for mood instability upon discharge  --Pt can follow-up at [facility] walk-in behavioral health clinic upon discharge.  [facility] walk-in Behavioral Health clinic  [address] [place] [id number]  Please bring insurance card and ID    Problem/Plan - 3:  ·  Problem: Seizure.   ·  Plan: No known past seizure hx but with witnessed seizure at [facility] w/ left eye deviation during seizure. S/p Keppra loading dose. CT head wnl. Utox neg.   - vEEG reports 3/8: Moderate generalized slowing suggestive of a moderate degree of diffuse or multifocal dysfunction. Some improvement in background slowing from previous day.     Problem/Plan - 4:  ·  Problem: Left bundle branch block.   ·  Plan: Left bundle of unknown chronicity. Upsloping T waves in V2/V3. Trop I wnl.  No clinical signs of HF, BNP 3100. Likely w/ some component of demand ischemia.  - Troponins plateaued   - TTE not able to be performed as patient was agitated, and is currently declining it.    Problem/Plan - 5:  ·  Problem: MEERA (acute kidney injury).   ·  Plan: Cr 2.03 on admission (3/6). Likely pre-renal MEERA.. Unknown baseline Cr.   I/s/o severe sepsis, Cr improving w/ treatment of sepsis.  - Cr continuing to downtrend  - continue to trend creatinine.    Problem/Plan - 6:  ·  Problem: Normocytic anemia.   ·  Plan: Likely acute on chronic. P/w Hgb 2.2 and hypoproliferative retic index on admission. No signs of bleeding. No known malignancies. MCV normocytic so unlikely to have thalassemia. normal hemolysis labs.   S/p 7u pRBC.   Highest on differential for anemia is bird mites biting and sucking on blood.  - active T&S  - transfuse for hgb <7.    Problem/Plan - 7:  ·  Problem: Infestation by mites.   ·  Plan:  came to [facility] and identified bugs as "bird mites". He was decontaminated at [facility]. Bugs seen on patient and sheets at [facility] ICU (3/7) , and within vEEG dressing.  - s/p permethrin wash and shaving   - decontaminated multiple times, last on 3/7 and no bugs seen last few days, as per Epidemiology can now take off isolation precautions.    New Medications: Depakote  Previous Medications Held: None

## 2023-03-12 NOTE — BH CONSULTATION LIAISON PROGRESS NOTE - NSBHASSESSMENTFT_PSY_ALL_CORE
46yo man (Isaiah Ramos  1977), homeless, unknown history, found down and BIBEMS to ED with AMS, severe anemia, metabolic acidosis, witnessed seizure, intubated for airway protection and started on pressors for hypovolemic v septic shock, admitted to ICU. Recevied 7u pRBC for anemia likely in the setting of mites. Psychiatry has been following for intermittent agitation with concern for delirium, r/o psychotic disorder, r/o goal-directed behavior in the setting of an unspecified v antisocial personality disorder. Patient today attentive, linear, able to participate in evaluation. He denied any SI, HI, AH, or other psychiatric symptoms, which raises further concern for his prior goal-directed behavior such as urinating on the floor, throwing food, feces, requiring security presence. However, there is also concern for the mechanism by which he arrived to the hospital - the patient clearly with profound lack of self-care leading to anemia and other preventative medical issues. Patient also with vague paranoid statements today about being tortured, but without ability to elaborate. On the other hand, patient with overt interest in basic needs here in the hospital, making demands for food, juice, and making needs otherwise known. Will continue to evaluate for primary psychotic disorder given overt poor self-care prior to hospitalization, but suspect otherwise concurrent antisocial traits. Patient also consistently receiving Haldol and Depakote standing, so possible behavioral control improvement in this setting.    RECOMMENDATIONS:  --1:1 for elopement precautions and unpredictability  --NOT psychiatrically cleared for discharge at this time; will continue to consider need for inpatient admission  --Continue Depakote 500mg bid standing for mood instability  --Continue Haldol 5mg PO/IV tid for agitation, concern for psychosis  --Continue to monitor EKG for QTc; hold Haldol for QTc >500ms  --Haldol 5mg PO/IV/IM q6hr prn agitation  --Ativan 2mg PO/IV/IM q6hr agitation 2nd line  --Haldol 5 mg po/IV tid (monitor for QTc prolongation)  --Trazodone 50mg qhs insomnia    
44yo man (Isaiah Ramos  1977), homeless, unknown history, found down and BIBEMS to ED with AMS, severe anemia, metabolic acidosis, witnessed seizure, intubated for airway protection and started on pressors for hypovolemic v septic shock, admitted to ICU. Recevied 7u pRBC for anemia likely in the setting of mites. Psychiatry has been following for intermittent agitation with concern for delirium, r/o psychotic disorder, r/o goal-directed behavior in the setting of an unspecified v antisocial personality disorder. Patient today attentive, linear, able to participate in evaluation. He denied any SI, HI, AH, or other psychiatric symptoms, which raises further concern for his prior goal-directed behavior such as urinating on the floor, throwing food, feces, requiring security presence. However, there is also concern for the mechanism by which he arrived to the hospital - the patient clearly with profound lack of self-care leading to anemia and other preventative medical issues. Patient no longer endorsing paranoia. Importantly, pt with overt interest in basic needs here in the hospital, making demands for food, juice, and making needs otherwise known. No evidence that pt is suffering from decompensated psychotic illness and therefore no indication that pt requires involuntary psychiatric hospitalization. Patient also consistently receiving Haldol and Depakote standing, so possible behavioral control improvement in this setting; pt agreeable to continue medications upon discharge. Patient is psychiatrically cleared for discharge.    RECOMMENDATIONS:  --Patient is psychiatrically cleared for discharge.  --Consider PT consult.  --Can continue Depakote 500 mg bid for mood instability upon discharge  --Pt can follow-up at [facility] [place], operated by [facility] walk-in behavioral health clinic upon discharge.  --case d/w APOD Dr. Newsome who agrees with above plan    
52yo AA M with unknown PMH or past psychiatric history, undomiciled, presented to [facility] by EMS after being found on the ground. Arrived to ED with AMS, severe anemia, metabolic acidosis. Had a witnessed seizure at [facility] and was keppra loaded; Intubated for airway protection at [facility]. At [facility], started on pressors for hypovolemic (hemorrhagic) vs septic shock. 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 Was on zosyn to cover for PNA (CT A/P w/ complete consolidation of the visualized right lower lobe), now transitioned to cefpodoxime to complete course. Decontaminated multiple times with permethrin and now can take off isolation precautions as per Epidemiology. Shock now resolved. Psych following for agitation. Was initially on precedex gtt, now weaned off, will c/w haldol prn. Pt had an episode of defecating on the floor and throwing food on the floor refusing evaluation by primary team.     On evaluation today pt was minimally cooperative with the exam, irritable, pulling blanket over his head. He however accepted food, demonstrating good behavioral control during a brief evaluation. Pt was able to answer some questions appropriately.  There was no evidence of acute psychosis, SI/HI on exam. Although due to poor cooperation, the exam was limited. Pt's clinical presentation is suggestive of resolving delirium (pt was incoherent yesterday). Personality pathology and/or underlying psychotic disorder can not be ruled out at this time.     Recs:  Haldol 5 mg po/IV tid (monitor for QTc prolongation)  Haldol 5 mg IV/IM/PO q 6 hrs prn agitation  Ativan 2 mg IM/PO/IV q 6 hrs prn agitation (only use as a second line prn)  Depakote 500 mg bid  Trazodone 50 mg qhs   consult to provide shelter referral  We will continue to follow

## 2023-03-12 NOTE — CHART NOTE - NSCHARTNOTEFT_GEN_A_CORE
Admitting Diagnosis:   Patient is a 53y old  Male who presents with a chief complaint of hemorrhagic shock (11 Mar 2023 12:32)      PAST MEDICAL & SURGICAL HISTORY:  No pertinent past medical history          Current Nutrition Order:  Regular     PO Intake: Good (%) [   ]  Fair (50-75%) [ X ] Poor (<25%) [   ] - per RN report     GI Issues:   incontinence, last BM 3/10 per chart     Pain:  pain scale 0     Skin Integrity:  vero scale 12   Pressure Injury 1: Right:,buttocks, Stage IV  Pressure Injury 2: sacrum, Stage II    Labs:     CAPILLARY BLOOD GLUCOSE      POCT Blood Glucose.: 90 mg/dL (12 Mar 2023 07:17)      Medications:  MEDICATIONS  (STANDING):  dextrose 5%. 1000 milliLiter(s) (100 mL/Hr) IV Continuous <Continuous>  dextrose 5%. 1000 milliLiter(s) (50 mL/Hr) IV Continuous <Continuous>  dextrose 50% Injectable 25 Gram(s) IV Push once  dextrose 50% Injectable 12.5 Gram(s) IV Push once  dextrose 50% Injectable 25 Gram(s) IV Push once  divalproex  milliGRAM(s) Oral every 12 hours  folic acid 1 milliGRAM(s) Oral daily  glucagon  Injectable 1 milliGRAM(s) IntraMuscular once  haloperidol    Injectable 5 milliGRAM(s) IntraMuscular every 6 hours  heparin   Injectable 5000 Unit(s) SubCutaneous every 8 hours  insulin lispro (ADMELOG) corrective regimen sliding scale   SubCutaneous Before meals and at bedtime  traZODone 50 milliGRAM(s) Oral at bedtime    MEDICATIONS  (PRN):  acetaminophen     Tablet .. 650 milliGRAM(s) Oral every 6 hours PRN Temp greater or equal to 38C (100.4F), Mild Pain (1 - 3)  dextrose Oral Gel 15 Gram(s) Oral once PRN Blood Glucose LESS THAN 70 milliGRAM(s)/deciliter  haloperidol    Injectable 2.5 milliGRAM(s) IntraMuscular every 3 hours PRN Agitation      Weight:  Daily     Daily     Weight Change: no new wts to review at this time, last taken on admission 3/6     Estimated energy needs:   Estimated Energy Needs Weight (kg)	70 kg  Kcal/day (25 - 30kcal/kg) = 1750 - 2100  g protein/day (1.4 - 1.6g/kg) = 98 - 112  fluids (1ml/kcal) = 1750 - 2100   Other Calculations	Actual body weight used to calculate needs due to no ht in EMR adjusted for vented pt    Subjective:   54yo M with unknown PMH, undomiciled, presented to [facility] by EMS after being found on the ground. Arrived to ED with AMS, severe anemia, metabolic acidosis. Intubated for airway protection at [facility]. Continue management at [facility] MICU. 3/8 regular diet initiated. 3/10 STEPDOWN to [facility]. 3/11: calm, requesting shelter placement. Not cleared for discharge per psych, placed on constant observation. planned for CT scan tomorrow     Placed on constant observation, talked with RN who reports good PO intake/tolerance which is encouraging. RD to re-attempt full NFPE and nutrition hx as able at f/u. Nutrition-related labs are unremarkable at this time. No other reports GI distress or further nutritional concerns at this time. RDN will continue to reassess, intervene, and monitor as appropriate    Previous Nutrition Diagnosis:  Inadequate Oral Intake r/t intubation AEB need for NPO status    Active [   ]  Resolved [ X ]    If resolved, new PES: Increased energy needs r/t PU AEB PU Right buttocks Stage IV,  PU sacrum Stage II    Goal:  - Consistently meets > 75% EER   - improved skin integrity     Recommendations:  1. Continue with current diet order (regular)  2. Monitor PO intake closely; honor pt food preferences as able  3. Add daily multivitamin and vitamin C 500mg BID if not contraindicated to promote wound healing   4. Monitor chemistry, GI, skin integrity, wts     5. RD to remain available for recs adjustment prn     Education: not appropriate at this time     Risk Level: High [ X ] Moderate [   ] Low [   ]    Emilie Munroe RD
Left femoral CVC removed, pressure applied for 10 minutes, hemostasis obtained and gauze pressure dressing applied.

## 2023-03-12 NOTE — DISCHARGE NOTE PROVIDER - CARE PROVIDER_API CALL
Herbert Valentine)  Internal Medicine  [address] [place] [address]  Phone: [phone]  Fax: [phone]  Follow Up Time: 2 weeks   [facility]  [address] [address]    SCHEDULE APPOINTMENT:  call [phone]  Appointment Scheduling Call Hours:   M to F  : 8:00 AM – 10:00 PM  S to S   : 8:00 AM – 8:00 PM  Phone: (   )    -  Fax: (   )    -  Follow Up Time: 1 week

## 2023-03-12 NOTE — DIETITIAN INITIAL EVALUATION ADULT - WEIGHT USED FOR CALCULATIONS
[facility] AMBULATORY ENCOUNTER  URGENT CARE OFFICE VISIT    SUBJECTIVE:  Jacquelin Pina is a 30 year old female who presented requesting evaluation for URI symptoms, sore throat.  History from patient.  She reports cough, nasal congestion for about a week. She traveled to the [facility] last weekend and thinks she picked something up. She also developed a sore throat last weekend which has persisted and is quite severe. Hurts to swallow, but she's able to swallow. She is not short of breath. Today she looked in throat and noticed white spots which prompted her to come in. Did have a low grade fever yesterday of 100F. Has had headaches and body aches. Her coworker was out sick last week with strep. She has used ibuprofen with some relief. She took a home covid test and it was negative.  She is not pregnant but is breastfeeding. No other complaints.    REVIEW OF SYSTEMS:    A review of systems was performed and findings relevant to these symptoms are included in the HPI.     PAST HISTORIES:    ALLERGIES:  No Known Allergies    MEDICATIONS:  Current Outpatient Medications   Medication Sig   • norethindrone (Ortho Micronor) 0.35 MG tablet Take 1 tablet by mouth daily.   • NIFEdipine XL (PROCARDIA XL) 30 MG 24 hr tablet Take 1 tablet by mouth daily.   • docusate sodium (COLACE) 100 MG capsule Take 100 mg by mouth in the morning and 100 mg in the evening.   • ibuprofen (MOTRIN) 600 MG tablet Take 600 mg by mouth every 6 hours as needed for Pain.   • Prenat w/o [id number] (PNV-DHA PO)      No current facility-administered medications for this visit.       Past Medical History:   Diagnosis Date   • ASCUS of cervix with negative high risk HPV 10/11/2013   • COVID-19 08/2021   • HEMARTHROSIS, LT KNEE 06/11/2010   • Motion sickness    • MVP (mitral valve prolapse) 04/12/2012    mild   • PONV (postoperative nausea and vomiting)    • Seborrheic dermatitis    • SPRAIN ACL, LT KNEE - DOI 6/8/10 06/11/2010   • SPRAIN MCL, LT KNEE  - DOI 6/8/10 06/11/2010   • Syncope 04/05/2012   • Varicella without mention of complication      Past Surgical History:   Procedure Laterality Date   • Echo heart resting  04/12/2012    mild prolapse of the posterior mitral valve leaflet. Otherwise normal echo.   • Knee scope,aid ant cruciate repair  06/30/2010    Knee Scope,Ant Repair / left   • Surg rx missed abortn,1st tri  12/15/2021    hysteroscopy, suction/D&C   • [place] tooth extraction       Social History     Tobacco Use   • Smoking status: Never   • Smokeless tobacco: Never   Vaping Use   • Vaping Use: never used   Substance Use Topics   • Alcohol use: Not Currently     Alcohol/week: 2.0 - 3.0 standard drinks     Types: 2 - 3 Glasses of wine per week     Comment: occasional--once per week--2-3 drinks, not with pregnancy   • Drug use: No       OBJECTIVE:  PHYSICAL EXAM:    Visit Vitals  /82   Pulse 89   Temp 99.7 °F (37.6 °C) (Oral)   Resp 18   Wt 95.3 kg (210 lb)   LMP 01/02/2022 (Exact Date)   SpO2 98%   Breastfeeding Yes   BMI 32.89 kg/m²        CONSTITUTIONAL: Well-hydrated, well-nourished female who appears to be in no acute distress. Awake, alert and cooperative. Nontoxic, pleasant.  HEENT: Head normocephalic and atraumatic. PERRLA. Conjunctivae clear without erythema or exudate. TMs are clear bilaterally. External ears without deformities. Hearing is grossly normal. Nose without deformities. There is moist nasal mucosa. Throat is erythematous with moist mucous membranes, +tonsillar exudate bilaterally. Oropharynx patent. No drooling or pooling of secretions. Phonation clear. No abscess or uvular deviation.  NECK: Neck supple. No anterior or posterior cervical lymphadenopathy or thyroid enlargement. There is full range of motion.   LUNGS: Breathing unlabored and speaking in full sentences. Lung sounds clear to auscultation bilaterally. No wheezes, rales or rhonchi noted.   CARDIOVASCULAR: Regular rate and rhythm with normal S1 and S2. There are  no murmurs auscultated.   MUSCULOSKELETAL: Steady gait.  SKIN: Warm, dry, intact without rash or lesion.  NEUROLOGIC: Alert and oriented x3.  PSYCHIATRIC:  Speech and behavior appropriate. Normal mood and affect.      ASSESSMENT AND PLAN:  Jacquelin was seen today for uri.    Diagnoses and all orders for this visit:    Pharyngitis with viral syndrome  -     GROUP A STREP THROAT PCR      Strep: negative    I called patient with results.     Patient is well appearing, afebrile, in no respiratory distress. Vital signs are stable, including normal oxygen saturations. No adventitious lung sounds. Throat exam reveals an erythematous throat with exudate, but strep is negative so this is likely a viral tonsillitis. She does have some postnasal drip which is likely irritating the throat. Suspect viral illness and discussed symptomatic cares including tylenol, ibuprofen, cough and cold medications, rest, fluids. I offered lidocaine but she declines. Expect improvement, but aware to return if new or worsening symptoms, specifically worsening sore throat, trouble swallowing, trouble breathing. She is agreeable.     FOLLOW-UP:     No follow-ups on file.      The patient was advised to follow up with primary physician or to recheck with the urgent care clinic sooner if symptoms get worse or if new symptoms appear.    The patient indicated understanding of the diagnosis and agreed with the plan of care.    MARCELA Mathews  Collaborating Physician: Anjelica Mack MD     current weight

## 2023-03-12 NOTE — BH CONSULTATION LIAISON PROGRESS NOTE - NSBHCHARTREVIEWVS_PSY_A_CORE FT
Vital Signs Last 24 Hrs  T(C): 37.2 (11 Mar 2023 11:00), Max: 37.2 (11 Mar 2023 11:00)  T(F): 99 (11 Mar 2023 11:00), Max: 99 (11 Mar 2023 11:00)  HR: 88 (11 Mar 2023 11:00) (88 - 99)  BP: 133/78 (11 Mar 2023 11:00) (133/78 - 138/83)  BP(mean): 106 (11 Mar 2023 05:00) (106 - 106)  RR: 20 (11 Mar 2023 11:00) (20 - 34)  SpO2: 95% (11 Mar 2023 11:00) (95% - 96%)    Parameters below as of 11 Mar 2023 11:00  Patient On (Oxygen Delivery Method): room air    
Vital Signs Last 24 Hrs  T(C): 35.6 (10 Mar 2023 06:00), Max: 36.4 (10 Mar 2023 01:21)  T(F): 96.1 (10 Mar 2023 06:00), Max: 97.5 (10 Mar 2023 01:21)  HR: 61 (10 Mar 2023 08:00) (49 - 91)  BP: 118/86 (10 Mar 2023 08:00) (113/76 - 132/97)  BP(mean): 99 (10 Mar 2023 08:00) (89 - 111)  RR: 27 (10 Mar 2023 08:00) (16 - 27)  SpO2: 88% (10 Mar 2023 08:00) (77% - 100%)    Parameters below as of 10 Mar 2023 08:00  Patient On (Oxygen Delivery Method): room air    
Vital Signs Last 24 Hrs  T(C): 36.6 (12 Mar 2023 05:28), Max: 36.9 (11 Mar 2023 21:55)  T(F): 97.8 (12 Mar 2023 05:28), Max: 98.4 (11 Mar 2023 21:55)  HR: 105 (12 Mar 2023 05:28) (100 - 105)  BP: 147/90 (12 Mar 2023 05:28) (147/90 - 151/86)  BP(mean): --  RR: 19 (12 Mar 2023 05:28) (19 - 20)  SpO2: 100% (12 Mar 2023 05:28) (97% - 100%)    Parameters below as of 12 Mar 2023 05:28  Patient On (Oxygen Delivery Method): room air

## 2023-03-12 NOTE — PROGRESS NOTE ADULT - SUBJECTIVE AND OBJECTIVE BOX
OVERNIGHT EVENTS: None    SUBJECTIVE:  Patient seen and examined at bedside, sitting up, conversational, saying his name is Isaiah Ramos but refusing to answer further questions.     Vital Signs Last 12 Hrs  T(F): 97.8 (03-12-23 @ 05:28), Max: 97.8 (03-12-23 @ 05:28)  HR: 105 (03-12-23 @ 05:28) (105 - 105)  BP: 147/90 (03-12-23 @ 05:28) (147/90 - 147/90)  BP(mean): --  RR: 19 (03-12-23 @ 05:28) (19 - 19)  SpO2: 100% (03-12-23 @ 05:28) (100% - 100%)  I&O's Summary    11 Mar 2023 06:01  -  12 Mar 2023 07:00  --------------------------------------------------------  IN: 0 mL / OUT: 5725 mL / NET: -5725 mL    12 Mar 2023 07:01  -  12 Mar 2023 12:13  --------------------------------------------------------  IN: 0 mL / OUT: 1900 mL / NET: -1900 mL        PHYSICAL EXAM:  General: thin male, calm, resting in bed in NAD   HEENT: EOMI, PERRL, clear conjunctiva, no nasal or oropharyngeal discharge or exudates, MMM  Neck: supple, no cervical or post-auricular lymphadenopathy  Cardiovascular: +S1/S2, no murmurs/rubs/gallops, RRR  Respiratory: CTA bilaterally,  no wheezes/rales/rhonchi  Gastrointestinal: soft, slightly distended but nontender, normoactive bowel sounds   Genitourinary: no suprapubic tenderness, no CVA tenderness  Extremities: WWP; no edema, clubbing or cyanosis. 2-3 scabs on LLE.   Vascular: 2+ radial, DP/PT pulses B/L  Skin: Scabs on LLE as above, finger tips are markedly soiled and crusted.   Neurological: AAOx3 (to self, place, time), no focal deficits  Psych: no pressured speech, but often has tangential thinking.         LABS:                  RADIOLOGY & ADDITIONAL TESTS:    MEDICATIONS  (STANDING):  dextrose 5%. 1000 milliLiter(s) (100 mL/Hr) IV Continuous <Continuous>  dextrose 5%. 1000 milliLiter(s) (50 mL/Hr) IV Continuous <Continuous>  dextrose 50% Injectable 25 Gram(s) IV Push once  dextrose 50% Injectable 12.5 Gram(s) IV Push once  dextrose 50% Injectable 25 Gram(s) IV Push once  divalproex  milliGRAM(s) Oral every 12 hours  folic acid 1 milliGRAM(s) Oral daily  glucagon  Injectable 1 milliGRAM(s) IntraMuscular once  haloperidol    Injectable 5 milliGRAM(s) IntraMuscular every 6 hours  heparin   Injectable 5000 Unit(s) SubCutaneous every 8 hours  insulin lispro (ADMELOG) corrective regimen sliding scale   SubCutaneous Before meals and at bedtime  traZODone 50 milliGRAM(s) Oral at bedtime    MEDICATIONS  (PRN):  acetaminophen     Tablet .. 650 milliGRAM(s) Oral every 6 hours PRN Temp greater or equal to 38C (100.4F), Mild Pain (1 - 3)  dextrose Oral Gel 15 Gram(s) Oral once PRN Blood Glucose LESS THAN 70 milliGRAM(s)/deciliter  haloperidol    Injectable 2.5 milliGRAM(s) IntraMuscular every 3 hours PRN Agitation   OVERNIGHT EVENTS: None    SUBJECTIVE:  Patient seen and examined at bedside, sitting up, conversational, saying his name is Isaiah Ramos but refusing to answer further questions.     Vital Signs Last 12 Hrs  T(F): 97.8 (03-12-23 @ 05:28), Max: 97.8 (03-12-23 @ 05:28)  HR: 105 (03-12-23 @ 05:28) (105 - 105)  BP: 147/90 (03-12-23 @ 05:28) (147/90 - 147/90)  BP(mean): --  RR: 19 (03-12-23 @ 05:28) (19 - 19)  SpO2: 100% (03-12-23 @ 05:28) (100% - 100%)  I&O's Summary    11 Mar 2023 06:01  -  12 Mar 2023 07:00  --------------------------------------------------------  IN: 0 mL / OUT: 5725 mL / NET: -5725 mL    12 Mar 2023 07:01  -  12 Mar 2023 12:13  --------------------------------------------------------  IN: 0 mL / OUT: 1900 mL / NET: -1900 mL        PHYSICAL EXAM:  General: thin male, calm, resting in bed in NAD   HEENT: EOMI, PERRL, clear conjunctiva, no nasal or oropharyngeal discharge or exudates, MMM  Neck: supple, no cervical or post-auricular lymphadenopathy  Cardiovascular: +S1/S2, no murmurs/rubs/gallops, RRR  Respiratory: CTA bilaterally,  no wheezes/rales/rhonchi  Gastrointestinal: soft, slightly distended but nontender, normoactive bowel sounds   Genitourinary: no suprapubic tenderness, no CVA tenderness  Extremities: WWP; no edema, clubbing or cyanosis. 2-3 scabs on LLE.   Vascular: 2+ radial, DP/PT pulses B/L  Skin: Scabs on LLE as above, finger tips are markedly soiled  Neurological: AAOx3 (to self, place, time), no focal deficits  Psych: no pressured speech, but often has tangential thinking.         LABS:                  RADIOLOGY & ADDITIONAL TESTS:    MEDICATIONS  (STANDING):  dextrose 5%. 1000 milliLiter(s) (100 mL/Hr) IV Continuous <Continuous>  dextrose 5%. 1000 milliLiter(s) (50 mL/Hr) IV Continuous <Continuous>  dextrose 50% Injectable 25 Gram(s) IV Push once  dextrose 50% Injectable 12.5 Gram(s) IV Push once  dextrose 50% Injectable 25 Gram(s) IV Push once  divalproex  milliGRAM(s) Oral every 12 hours  folic acid 1 milliGRAM(s) Oral daily  glucagon  Injectable 1 milliGRAM(s) IntraMuscular once  haloperidol    Injectable 5 milliGRAM(s) IntraMuscular every 6 hours  heparin   Injectable 5000 Unit(s) SubCutaneous every 8 hours  insulin lispro (ADMELOG) corrective regimen sliding scale   SubCutaneous Before meals and at bedtime  traZODone 50 milliGRAM(s) Oral at bedtime    MEDICATIONS  (PRN):  acetaminophen     Tablet .. 650 milliGRAM(s) Oral every 6 hours PRN Temp greater or equal to 38C (100.4F), Mild Pain (1 - 3)  dextrose Oral Gel 15 Gram(s) Oral once PRN Blood Glucose LESS THAN 70 milliGRAM(s)/deciliter  haloperidol    Injectable 2.5 milliGRAM(s) IntraMuscular every 3 hours PRN Agitation

## 2023-03-12 NOTE — BH CONSULTATION LIAISON PROGRESS NOTE - NSBHCONSULTFOLLOWAFTERCARE_PSY_A_CORE FT
[facility] Outpatient walk-in Behavioral Health clinic  [address] [place] [id number]  Monday 3/13/23 at 9AM; please bring insurance card and ID

## 2023-03-12 NOTE — BH CONSULTATION LIAISON PROGRESS NOTE - NSBHCONSFOLLOWNEEDS_PSY_ALL_CORE
Needs further psych safety assessment prior to discharge
No psychiatric contraindications to discharge
Needs further psych safety assessment prior to discharge

## 2023-03-12 NOTE — BH CONSULTATION LIAISON PROGRESS NOTE - NSBHPSYCHOLCOGORIENT_PSY_A_CORE
Oriented to time, place, person, situation
Oriented to time, place, person, situation
Not fully oriented...

## 2023-03-12 NOTE — DISCHARGE NOTE PROVIDER - NSDCCPCAREPLAN_GEN_ALL_CORE_FT
PRINCIPAL DISCHARGE DIAGNOSIS  Diagnosis: Pneumonia  Assessment and Plan of Treatment: You have pneumonia, which is an infection in your lungs. In the hospital, your health care providers helped you breathe better. They also gave you medicine to help your body get rid of the germs that cause pneumonia. They also made sure you got enough liquids and nutrients.  You will be discharged on CEFPODOXIME for 5 additional days. Please ensure you take this medication for its entire course. Please make an appointment with your primary care provider on discharge for within 2 weeks from this date and follow up with them.      SECONDARY DISCHARGE DIAGNOSES  Diagnosis: Agitation  Assessment and Plan of Treatment: Please take your prescribed  Depakote. You can follow up at [facility] Outpatient walk-in behavioral health clinic upon discharge.  [facility] Outpatient walk-in Behavioral Health clinic  [address] [place] [id number]  Monday 3/13/23 at 9AM; please bring insurance card and ID     PRINCIPAL DISCHARGE DIAGNOSIS  Diagnosis: Pneumonia  Assessment and Plan of Treatment: You have pneumonia, which is an infection in your lungs. In the hospital, your health care providers helped you breathe better. They also gave you medicine to help your body get rid of the germs that cause pneumonia. They also made sure you got enough liquids and nutrients.  You completed a course of CEFPODOXIME while inpatient.      SECONDARY DISCHARGE DIAGNOSES  Diagnosis: Agitation  Assessment and Plan of Treatment: Please take your prescribed  Depakote. You can follow up at [facility] Outpatient walk-in behavioral health clinic upon discharge.  [facility] Outpatient walk-in Behavioral Health clinic  [address] [place] [id number]  Please bring insurance card and ID

## 2023-03-12 NOTE — PROGRESS NOTE ADULT - PROBLEM SELECTOR PLAN 2
Patient with severe episodes of agitation. Psych assessed patient - no clear psychiatric diagnoses at this time, but recommend the following:   --1:1 for elopement precautions and unpredictability  --NOT psychiatrically cleared for discharge at this time; psych will continue to consider need for inpatient admission to psychiatry   --Continue Depakote 500mg bid standing for mood instability  --Continue Haldol 5mg PO/IV tid for agitation, concern for psychosis  --Continue to monitor EKG for QTc; hold Haldol for QTc >500ms  --Haldol 5mg PO/IV/IM q6hr prn agitation  --Ativan 2mg PO/IV/IM q6hr agitation 2nd line  --Trazodone 50mg qhs insomnia Patient with severe episodes of agitation. Psych assessed patient - no clear psychiatric diagnoses at this time, but recommend the following:   --1:1 for elopement precautions and unpredictability  --Psychiatrically cleared for discharge at this time; psych will continue to consider need for inpatient admission to psychiatry   --Continue Depakote 500mg bid standing for mood instability  --Continue Haldol 5mg PO/IV tid for agitation, concern for psychosis  --Continue to monitor EKG for QTc; hold Haldol for QTc >500ms  --Haldol 5mg PO/IV/IM q6hr prn agitation  --Ativan 2mg PO/IV/IM q6hr agitation 2nd line  --Trazodone 50mg qhs insomnia  - d/c on depakote.

## 2023-03-12 NOTE — PROGRESS NOTE ADULT - PROBLEM SELECTOR PLAN 1
On admission hypothermic with leukocytosis, on ursula hugger and required right femoral warming catheter. Hypothermia likely 2/2 environment and infection. CT A/P w/ complete consolidation of visualized RLL, likely source of sepsis. MRSA negative, MSSA positive. Was on Zosyn but as patient was immediately removing IVs after they were placed, decision was made to change abx to PO.   - Cefpodoxime 200mg BID - end on 3/12  - Pending CT scan non con, however patient is deferring at this time. Will reattempt to do it on 3/12 On admission hypothermic with leukocytosis, on ursula hugger and required right femoral warming catheter. Hypothermia likely 2/2 environment and infection. CT A/P w/ complete consolidation of visualized RLL, likely source of sepsis. MRSA negative, MSSA positive. Was on Zosyn but as patient was immediately removing IVs after they were placed, decision was made to change abx to PO.   - Cefpodoxime 200mg BID x 10 days total- end on 3/16

## 2023-03-12 NOTE — DISCHARGE NOTE PROVIDER - NSDCMRMEDTOKEN_GEN_ALL_CORE_FT
cefpodoxime 200 mg oral tablet: 1 tab(s) orally 2 times a day   divalproex sodium 500 mg oral delayed release tablet: 1 tab(s) orally every 12 hours   divalproex sodium 500 mg oral delayed release tablet: 1 tab(s) orally every 12 hours
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
## 2023-03-12 NOTE — DISCHARGE NOTE PROVIDER - PROVIDER TOKENS
PROVIDER:[TOKEN:[[id number]],FOLLOWUP:[2 weeks]] FREE:[LAST:[[facility]],FIRST:[[facility]],PHONE:[(   )    -],FAX:[(   )    -],ADDRESS:[[place] [address]    SCHEDULE APPOINTMENT:  call [phone]  Appointment Scheduling Call Hours:   M to F  : 8:00 AM – 10:00 PM  S to S   : 8:00 AM – 8:00 PM],FOLLOWUP:[1 week]]

## 2023-03-12 NOTE — BH CONSULTATION LIAISON PROGRESS NOTE - CURRENT MEDICATION
MEDICATIONS  (STANDING):  dextrose 5%. 1000 milliLiter(s) (100 mL/Hr) IV Continuous <Continuous>  dextrose 5%. 1000 milliLiter(s) (50 mL/Hr) IV Continuous <Continuous>  dextrose 50% Injectable 25 Gram(s) IV Push once  dextrose 50% Injectable 12.5 Gram(s) IV Push once  dextrose 50% Injectable 25 Gram(s) IV Push once  divalproex  milliGRAM(s) Oral every 12 hours  folic acid 1 milliGRAM(s) Oral daily  glucagon  Injectable 1 milliGRAM(s) IntraMuscular once  haloperidol    Injectable 5 milliGRAM(s) IntraMuscular every 6 hours  heparin   Injectable 5000 Unit(s) SubCutaneous every 8 hours  insulin lispro (ADMELOG) corrective regimen sliding scale   SubCutaneous Before meals and at bedtime  traZODone 50 milliGRAM(s) Oral at bedtime    MEDICATIONS  (PRN):  acetaminophen     Tablet .. 650 milliGRAM(s) Oral every 6 hours PRN Temp greater or equal to 38C (100.4F), Mild Pain (1 - 3)  dextrose Oral Gel 15 Gram(s) Oral once PRN Blood Glucose LESS THAN 70 milliGRAM(s)/deciliter  haloperidol    Injectable 2.5 milliGRAM(s) IntraMuscular every 3 hours PRN Agitation  
MEDICATIONS  (STANDING):  cefpodoxime 200 milliGRAM(s) Oral every 12 hours  chlorhexidine 2% Cloths 1 Application(s) Topical <User Schedule>  dextrose 5%. 1000 milliLiter(s) (50 mL/Hr) IV Continuous <Continuous>  dextrose 5%. 1000 milliLiter(s) (100 mL/Hr) IV Continuous <Continuous>  dextrose 50% Injectable 25 Gram(s) IV Push once  dextrose 50% Injectable 12.5 Gram(s) IV Push once  dextrose 50% Injectable 25 Gram(s) IV Push once  divalproex  milliGRAM(s) Oral every 12 hours  folic acid Injectable 1 milliGRAM(s) IV Push daily  glucagon  Injectable 1 milliGRAM(s) IntraMuscular once  haloperidol    Injectable 5 milliGRAM(s) IntraMuscular every 6 hours  heparin   Injectable 5000 Unit(s) SubCutaneous every 8 hours  insulin lispro (ADMELOG) corrective regimen sliding scale   SubCutaneous Before meals and at bedtime  traZODone 50 milliGRAM(s) Oral at bedtime    MEDICATIONS  (PRN):  dextrose Oral Gel 15 Gram(s) Oral once PRN Blood Glucose LESS THAN 70 milliGRAM(s)/deciliter  haloperidol    Injectable 2.5 milliGRAM(s) IntraMuscular every 3 hours PRN Agitation  
MEDICATIONS  (STANDING):  cefpodoxime 200 milliGRAM(s) Oral every 12 hours  chlorhexidine 2% Cloths 1 Application(s) Topical <User Schedule>  dextrose 5%. 1000 milliLiter(s) (100 mL/Hr) IV Continuous <Continuous>  dextrose 5%. 1000 milliLiter(s) (50 mL/Hr) IV Continuous <Continuous>  dextrose 50% Injectable 25 Gram(s) IV Push once  dextrose 50% Injectable 12.5 Gram(s) IV Push once  dextrose 50% Injectable 25 Gram(s) IV Push once  folic acid Injectable 1 milliGRAM(s) IV Push daily  glucagon  Injectable 1 milliGRAM(s) IntraMuscular once  haloperidol    Injectable 5 milliGRAM(s) IntraMuscular every 6 hours  heparin   Injectable 5000 Unit(s) SubCutaneous every 8 hours  insulin lispro (ADMELOG) corrective regimen sliding scale   SubCutaneous Before meals and at bedtime  valproate sodium  IVPB 250 milliGRAM(s) IV Intermittent every 12 hours    MEDICATIONS  (PRN):  dextrose Oral Gel 15 Gram(s) Oral once PRN Blood Glucose LESS THAN 70 milliGRAM(s)/deciliter  haloperidol    Injectable 2.5 milliGRAM(s) IntraMuscular every 3 hours PRN Agitation

## 2023-03-12 NOTE — BH CONSULTATION LIAISON PROGRESS NOTE - NSBHATTESTBILLING_PSY_A_CORE
Billing in another system
Non-billable
[id number] OBS or IP - moderate complexity OR 35-49 mins

## 2023-03-13 PROCEDURE — 99233 SBSQ HOSP IP/OBS HIGH 50: CPT | Mod: GC

## 2023-03-13 RX ORDER — CEFPODOXIME PROXETIL 100 MG
200 TABLET ORAL EVERY 12 HOURS
Refills: 0 | Status: DISCONTINUED | OUTPATIENT
Start: 2023-03-13 | End: 2023-03-14

## 2023-03-13 RX ADMIN — HEPARIN SODIUM 5000 UNIT(S): 5000 INJECTION INTRAVENOUS; SUBCUTANEOUS at 15:04

## 2023-03-13 RX ADMIN — Medication 200 MILLIGRAM(S): at 12:54

## 2023-03-13 RX ADMIN — DIVALPROEX SODIUM 500 MILLIGRAM(S): 500 TABLET, DELAYED RELEASE ORAL at 17:54

## 2023-03-13 RX ADMIN — HEPARIN SODIUM 5000 UNIT(S): 5000 INJECTION INTRAVENOUS; SUBCUTANEOUS at 07:00

## 2023-03-13 RX ADMIN — DIVALPROEX SODIUM 500 MILLIGRAM(S): 500 TABLET, DELAYED RELEASE ORAL at 07:00

## 2023-03-13 RX ADMIN — Medication 50 MILLIGRAM(S): at 22:58

## 2023-03-13 RX ADMIN — Medication 200 MILLIGRAM(S): at 22:58

## 2023-03-13 RX ADMIN — HALOPERIDOL DECANOATE 5 MILLIGRAM(S): 100 INJECTION INTRAMUSCULAR at 23:29

## 2023-03-13 RX ADMIN — HALOPERIDOL DECANOATE 5 MILLIGRAM(S): 100 INJECTION INTRAMUSCULAR at 17:53

## 2023-03-13 RX ADMIN — HALOPERIDOL DECANOATE 5 MILLIGRAM(S): 100 INJECTION INTRAMUSCULAR at 12:54

## 2023-03-13 RX ADMIN — Medication 1 MILLIGRAM(S): at 12:54

## 2023-03-13 RX ADMIN — HEPARIN SODIUM 5000 UNIT(S): 5000 INJECTION INTRAVENOUS; SUBCUTANEOUS at 22:58

## 2023-03-13 RX ADMIN — HALOPERIDOL DECANOATE 5 MILLIGRAM(S): 100 INJECTION INTRAMUSCULAR at 07:00

## 2023-03-13 NOTE — PROGRESS NOTE ADULT - ASSESSMENT
52 y/o undomiciled M with unknown PMHx who presented from [facility] after being found down, admitted to MICU for hemorrhagic shock 2/2 bird mites and metabolic acidosis, requiring intubation for airway protection. Now extubated and stepped down to [facility] now medically ready for d/c pending shelter packet placement, psychiatrically cleared for d/c.   - Medically ready for D/c  - no indication for PT as patient was seen by nurses climbing on bed and bed surfing and also getting out of bed to defecate on the floor in front of the medical team and walking around his room without assistance.  - Pt requests a cane bc he was "pressing himself" during those episodes.   - psychiatrically no barriers to d/c

## 2023-03-13 NOTE — PHYSICAL THERAPY INITIAL EVALUATION ADULT - ACTIVE RANGE OF MOTION EXAMINATION, REHAB EVAL
except decreased all fingers both hands from recent injury as per patient/no Active ROM deficits were identified

## 2023-03-13 NOTE — PROGRESS NOTE ADULT - PROBLEM SELECTOR PLAN 2
Patient with severe episodes of agitation. Psych assessed patient - no clear psychiatric diagnoses at this time, but recommend the following:   --1:1 for elopement precautions and unpredictability  --Psychiatrically cleared for discharge at this time; psych will continue to consider need for inpatient admission to psychiatry   --Continue Depakote 500mg bid standing for mood instability  --Continue Haldol 5mg PO/IV tid for agitation, concern for psychosis  --Continue to monitor EKG for QTc; hold Haldol for QTc >500ms  --Haldol 5mg PO/IV/IM q6hr prn agitation  --Ativan 2mg PO/IV/IM q6hr agitation 2nd line  --Trazodone 50mg qhs insomnia  - d/c on depakote.

## 2023-03-13 NOTE — PHYSICAL THERAPY INITIAL EVALUATION ADULT - PERTINENT HX OF CURRENT PROBLEM, REHAB EVAL
52 y/o undomiciled M with unknown PMHx who presented from [facility] after being found down, admitted to MICU for hemorrhagic shock 2/2 bird mites and metabolic acidosis, requiring intubation for airway protection. Now extubated and stepped down to [facility]

## 2023-03-13 NOTE — PROVIDER CONTACT NOTE (OTHER) - REASON
Patient refused the morning blood draw from the phlebotomist
Pt richarditarick and refusing care
Pt refusing vitals
patient refusing vital signs

## 2023-03-13 NOTE — PROGRESS NOTE ADULT - PROBLEM SELECTOR PLAN 1
On admission hypothermic with leukocytosis, on ursula hugger and required right femoral warming catheter. Hypothermia likely 2/2 environment and infection. CT A/P w/ complete consolidation of visualized RLL, likely source of sepsis. MRSA negative, MSSA positive. Was on Zosyn but as patient was immediately removing IVs after they were placed, decision was made to change abx to PO.   - Cefpodoxime 200mg BID x 10 days total- end on 3/16

## 2023-03-14 VITALS
DIASTOLIC BLOOD PRESSURE: 79 MMHG | OXYGEN SATURATION: 98 % | RESPIRATION RATE: 17 BRPM | TEMPERATURE: 98 F | SYSTOLIC BLOOD PRESSURE: 111 MMHG | HEART RATE: 100 BPM

## 2023-03-14 LAB — GLUCOSE BLDC GLUCOMTR-MCNC: 109 MG/DL — HIGH (ref 70–99)

## 2023-03-14 PROCEDURE — 84443 ASSAY THYROID STIM HORMONE: CPT

## 2023-03-14 PROCEDURE — 87340 HEPATITIS B SURFACE AG IA: CPT

## 2023-03-14 PROCEDURE — 80307 DRUG TEST PRSMV CHEM ANLYZR: CPT

## 2023-03-14 PROCEDURE — 83010 ASSAY OF HAPTOGLOBIN QUANT: CPT

## 2023-03-14 PROCEDURE — 31500 INSERT EMERGENCY AIRWAY: CPT

## 2023-03-14 PROCEDURE — 87641 MR-STAPH DNA AMP PROBE: CPT

## 2023-03-14 PROCEDURE — 95708 EEG WO VID EA 12-26HR UNMNTR: CPT

## 2023-03-14 PROCEDURE — 71045 X-RAY EXAM CHEST 1 VIEW: CPT

## 2023-03-14 PROCEDURE — 86850 RBC ANTIBODY SCREEN: CPT

## 2023-03-14 PROCEDURE — 82570 ASSAY OF URINE CREATININE: CPT

## 2023-03-14 PROCEDURE — 82553 CREATINE MB FRACTION: CPT

## 2023-03-14 PROCEDURE — 84100 ASSAY OF PHOSPHORUS: CPT

## 2023-03-14 PROCEDURE — 86704 HEP B CORE ANTIBODY TOTAL: CPT

## 2023-03-14 PROCEDURE — 87389 HIV-1 AG W/HIV-1&-2 AB AG IA: CPT

## 2023-03-14 PROCEDURE — 83036 HEMOGLOBIN GLYCOSYLATED A1C: CPT

## 2023-03-14 PROCEDURE — C1751: CPT

## 2023-03-14 PROCEDURE — 84478 ASSAY OF TRIGLYCERIDES: CPT

## 2023-03-14 PROCEDURE — 82803 BLOOD GASES ANY COMBINATION: CPT

## 2023-03-14 PROCEDURE — 85027 COMPLETE CBC AUTOMATED: CPT

## 2023-03-14 PROCEDURE — 86900 BLOOD TYPING SEROLOGIC ABO: CPT

## 2023-03-14 PROCEDURE — 83020 HEMOGLOBIN ELECTROPHORESIS: CPT

## 2023-03-14 PROCEDURE — 94002 VENT MGMT INPAT INIT DAY: CPT

## 2023-03-14 PROCEDURE — 82272 OCCULT BLD FECES 1-3 TESTS: CPT

## 2023-03-14 PROCEDURE — 83735 ASSAY OF MAGNESIUM: CPT

## 2023-03-14 PROCEDURE — 36415 COLL VENOUS BLD VENIPUNCTURE: CPT

## 2023-03-14 PROCEDURE — 80048 BASIC METABOLIC PNL TOTAL CA: CPT

## 2023-03-14 PROCEDURE — 86709 HEPATITIS A IGM ANTIBODY: CPT

## 2023-03-14 PROCEDURE — 86705 HEP B CORE ANTIBODY IGM: CPT

## 2023-03-14 PROCEDURE — 85730 THROMBOPLASTIN TIME PARTIAL: CPT

## 2023-03-14 PROCEDURE — 36556 INSERT NON-TUNNEL CV CATH: CPT

## 2023-03-14 PROCEDURE — 81001 URINALYSIS AUTO W/SCOPE: CPT

## 2023-03-14 PROCEDURE — 99239 HOSP IP/OBS DSCHRG MGMT >30: CPT | Mod: GC

## 2023-03-14 PROCEDURE — 84484 ASSAY OF TROPONIN QUANT: CPT

## 2023-03-14 PROCEDURE — 85610 PROTHROMBIN TIME: CPT

## 2023-03-14 PROCEDURE — 85384 FIBRINOGEN ACTIVITY: CPT

## 2023-03-14 PROCEDURE — 85379 FIBRIN DEGRADATION QUANT: CPT

## 2023-03-14 PROCEDURE — 86920 COMPATIBILITY TEST SPIN: CPT

## 2023-03-14 PROCEDURE — 97116 GAIT TRAINING THERAPY: CPT

## 2023-03-14 PROCEDURE — 70450 CT HEAD/BRAIN W/O DYE: CPT

## 2023-03-14 PROCEDURE — 87070 CULTURE OTHR SPECIMN AEROBIC: CPT

## 2023-03-14 PROCEDURE — 86901 BLOOD TYPING SEROLOGIC RH(D): CPT

## 2023-03-14 PROCEDURE — 86706 HEP B SURFACE ANTIBODY: CPT

## 2023-03-14 PROCEDURE — 80053 COMPREHEN METABOLIC PANEL: CPT

## 2023-03-14 PROCEDURE — 82962 GLUCOSE BLOOD TEST: CPT

## 2023-03-14 PROCEDURE — 86803 HEPATITIS C AB TEST: CPT

## 2023-03-14 PROCEDURE — 82607 VITAMIN B-12: CPT

## 2023-03-14 PROCEDURE — 84300 ASSAY OF URINE SODIUM: CPT

## 2023-03-14 PROCEDURE — 82330 ASSAY OF CALCIUM: CPT

## 2023-03-14 PROCEDURE — 97166 OT EVAL MOD COMPLEX 45 MIN: CPT

## 2023-03-14 PROCEDURE — 87640 STAPH A DNA AMP PROBE: CPT

## 2023-03-14 PROCEDURE — 85660 RBC SICKLE CELL TEST: CPT

## 2023-03-14 PROCEDURE — 96375 TX/PRO/DX INJ NEW DRUG ADDON: CPT

## 2023-03-14 PROCEDURE — 36430 TRANSFUSION BLD/BLD COMPNT: CPT

## 2023-03-14 PROCEDURE — 87637 SARSCOV2&INF A&B&RSV AMP PRB: CPT

## 2023-03-14 PROCEDURE — 82550 ASSAY OF CK (CPK): CPT

## 2023-03-14 PROCEDURE — 74176 CT ABD & PELVIS W/O CONTRAST: CPT

## 2023-03-14 PROCEDURE — 83880 ASSAY OF NATRIURETIC PEPTIDE: CPT

## 2023-03-14 PROCEDURE — 83605 ASSAY OF LACTIC ACID: CPT

## 2023-03-14 PROCEDURE — 94003 VENT MGMT INPAT SUBQ DAY: CPT

## 2023-03-14 PROCEDURE — 86923 COMPATIBILITY TEST ELECTRIC: CPT

## 2023-03-14 PROCEDURE — 97161 PT EVAL LOW COMPLEX 20 MIN: CPT

## 2023-03-14 PROCEDURE — 85045 AUTOMATED RETICULOCYTE COUNT: CPT

## 2023-03-14 PROCEDURE — 87040 BLOOD CULTURE FOR BACTERIA: CPT

## 2023-03-14 PROCEDURE — 82746 ASSAY OF FOLIC ACID SERUM: CPT

## 2023-03-14 PROCEDURE — 99291 CRITICAL CARE FIRST HOUR: CPT | Mod: 25

## 2023-03-14 PROCEDURE — 80202 ASSAY OF VANCOMYCIN: CPT

## 2023-03-14 PROCEDURE — 95705 EEG W/O VID 2-12 HR UNMNTR: CPT

## 2023-03-14 PROCEDURE — 95700 EEG CONT REC W/VID EEG TECH: CPT

## 2023-03-14 PROCEDURE — 96374 THER/PROPH/DIAG INJ IV PUSH: CPT | Mod: XU

## 2023-03-14 PROCEDURE — P9016: CPT

## 2023-03-14 PROCEDURE — 83615 LACTATE (LD) (LDH) ENZYME: CPT

## 2023-03-14 PROCEDURE — 85025 COMPLETE CBC W/AUTO DIFF WBC: CPT

## 2023-03-14 RX ORDER — DIVALPROEX SODIUM 500 MG/1
1 TABLET, DELAYED RELEASE ORAL
Qty: 28 | Refills: 0
Start: 2023-03-14 | End: 2023-03-27

## 2023-03-14 RX ADMIN — Medication 1 MILLIGRAM(S): at 11:28

## 2023-03-14 RX ADMIN — DIVALPROEX SODIUM 500 MILLIGRAM(S): 500 TABLET, DELAYED RELEASE ORAL at 06:57

## 2023-03-14 RX ADMIN — HEPARIN SODIUM 5000 UNIT(S): 5000 INJECTION INTRAVENOUS; SUBCUTANEOUS at 06:57

## 2023-03-14 RX ADMIN — Medication 200 MILLIGRAM(S): at 11:28

## 2023-03-14 RX ADMIN — HALOPERIDOL DECANOATE 5 MILLIGRAM(S): 100 INJECTION INTRAMUSCULAR at 06:57

## 2023-03-14 NOTE — OCCUPATIONAL THERAPY INITIAL EVALUATION ADULT - PLANNED THERAPY INTERVENTIONS, OT EVAL
ADL retraining/IADL retraining/balance training/bed mobility training/cognitive, visual perceptual/ROM/strengthening/transfer training

## 2023-03-14 NOTE — PROGRESS NOTE ADULT - SUBJECTIVE AND OBJECTIVE BOX
OVERNIGHT EVENTS: None    SUBJECTIVE:  Patient seen in doorway, standing unassisted comfortably, denied fever dyspnea chest pain but also repeatedly refused further questions and examination until "after I have had my coffee"    Vital Signs Last 12 Hrs  T(F): 97.9 (03-14-23 @ 06:06), Max: 97.9 (03-14-23 @ 06:06)  HR: 99 (03-14-23 @ 06:06) (99 - 99)  BP: 109/75 (03-14-23 @ 06:06) (109/75 - 109/75)  BP(mean): --  RR: 18 (03-14-23 @ 06:06) (18 - 18)  SpO2: 97% (03-14-23 @ 06:06) (97% - 97%)  I&O's Summary      PHYSICAL EXAM  Will reattempt.         LABS:                  RADIOLOGY & ADDITIONAL TESTS:    MEDICATIONS  (STANDING):  cefpodoxime 200 milliGRAM(s) Oral every 12 hours  dextrose 5%. 1000 milliLiter(s) (100 mL/Hr) IV Continuous <Continuous>  dextrose 5%. 1000 milliLiter(s) (50 mL/Hr) IV Continuous <Continuous>  dextrose 50% Injectable 25 Gram(s) IV Push once  dextrose 50% Injectable 12.5 Gram(s) IV Push once  dextrose 50% Injectable 25 Gram(s) IV Push once  divalproex  milliGRAM(s) Oral every 12 hours  folic acid 1 milliGRAM(s) Oral daily  glucagon  Injectable 1 milliGRAM(s) IntraMuscular once  haloperidol    Injectable 5 milliGRAM(s) IntraMuscular every 6 hours  heparin   Injectable 5000 Unit(s) SubCutaneous every 8 hours  insulin lispro (ADMELOG) corrective regimen sliding scale   SubCutaneous Before meals and at bedtime  traZODone 50 milliGRAM(s) Oral at bedtime    MEDICATIONS  (PRN):  acetaminophen     Tablet .. 650 milliGRAM(s) Oral every 6 hours PRN Temp greater or equal to 38C (100.4F), Mild Pain (1 - 3)  dextrose Oral Gel 15 Gram(s) Oral once PRN Blood Glucose LESS THAN 70 milliGRAM(s)/deciliter  haloperidol    Injectable 2.5 milliGRAM(s) IntraMuscular every 3 hours PRN Agitation   OVERNIGHT EVENTS: None    SUBJECTIVE:  Patient seen in doorway, standing unassisted comfortably, denied fever dyspnea chest pain but also repeatedly refused further questions and examination until "after I have had my coffee"    Vital Signs Last 12 Hrs  T(F): 97.9 (03-14-23 @ 06:06), Max: 97.9 (03-14-23 @ 06:06)  HR: 99 (03-14-23 @ 06:06) (99 - 99)  BP: 109/75 (03-14-23 @ 06:06) (109/75 - 109/75)  BP(mean): --  RR: 18 (03-14-23 @ 06:06) (18 - 18)  SpO2: 97% (03-14-23 @ 06:06) (97% - 97%)  I&O's Summary      PHYSICAL EXAM  General: thin male, calm, resting in bed in NAD   HEENT: EOMI, PERRL, clear conjunctiva, no nasal or oropharyngeal discharge or exudates, MMM  Neck: supple, no cervical or post-auricular lymphadenopathy  Cardiovascular: +S1/S2, no murmurs/rubs/gallops, RRR  Respiratory: CTA bilaterally,  no wheezes/rales/rhonchi  Gastrointestinal: soft, slightly distended but nontender, normoactive bowel sounds   Genitourinary: no suprapubic tenderness, no CVA tenderness  Extremities: WWP; no edema, clubbing or cyanosis. 2-3 scabs on LLE.   Vascular: 2+ radial, DP/PT pulses B/L  Skin: Scabs on LLE as above, finger tips are markedly soiled  Neurological: AAOx3 (to self, place, time), no focal deficits  Psych: no pressured speech, but often has tangential thinking.         LABS:  Holiday                      RADIOLOGY, EKG & ADDITIONAL TESTS:                 RADIOLOGY & ADDITIONAL TESTS:    MEDICATIONS  (STANDING):  cefpodoxime 200 milliGRAM(s) Oral every 12 hours  dextrose 5%. 1000 milliLiter(s) (100 mL/Hr) IV Continuous <Continuous>  dextrose 5%. 1000 milliLiter(s) (50 mL/Hr) IV Continuous <Continuous>  dextrose 50% Injectable 25 Gram(s) IV Push once  dextrose 50% Injectable 12.5 Gram(s) IV Push once  dextrose 50% Injectable 25 Gram(s) IV Push once  divalproex  milliGRAM(s) Oral every 12 hours  folic acid 1 milliGRAM(s) Oral daily  glucagon  Injectable 1 milliGRAM(s) IntraMuscular once  haloperidol    Injectable 5 milliGRAM(s) IntraMuscular every 6 hours  heparin   Injectable 5000 Unit(s) SubCutaneous every 8 hours  insulin lispro (ADMELOG) corrective regimen sliding scale   SubCutaneous Before meals and at bedtime  traZODone 50 milliGRAM(s) Oral at bedtime    MEDICATIONS  (PRN):  acetaminophen     Tablet .. 650 milliGRAM(s) Oral every 6 hours PRN Temp greater or equal to 38C (100.4F), Mild Pain (1 - 3)  dextrose Oral Gel 15 Gram(s) Oral once PRN Blood Glucose LESS THAN 70 milliGRAM(s)/deciliter  haloperidol    Injectable 2.5 milliGRAM(s) IntraMuscular every 3 hours PRN Agitation

## 2023-03-14 NOTE — OCCUPATIONAL THERAPY INITIAL EVALUATION ADULT - NS ASR FOLLOW COMMAND OT EVAL
limited with command following as pt becoming increasingly quite agitated during session/75% of the time/able to follow single-step instructions

## 2023-03-14 NOTE — PROGRESS NOTE ADULT - PROBLEM SELECTOR PLAN 6
Likely acute on chronic. P/w Hgb 2.2 and hypoproliferative retic index on admission. No signs of bleeding. No known malignancies. MCV normocytic so unlikely to have thalassemia. normal hemolysis labs.   S/p 7u pRBC.   Highest on differential for anemia is bird mites biting and sucking on blood.  - active T&S  - transfuse for hgb <7

## 2023-03-14 NOTE — OCCUPATIONAL THERAPY INITIAL EVALUATION ADULT - MANUAL MUSCLE TESTING RESULTS, REHAB EVAL
Pt declining formal MMT becoming progressively agitated. BUE grossly 3/5 however unable to formally assess 2/2 agitation.

## 2023-03-14 NOTE — PROGRESS NOTE ADULT - PROBLEM SELECTOR PLAN 7
came to [facility] and identified bugs as "bird mites". He was decontaminated at [facility]. Bugs seen on patient and sheets at [facility] ICU (3/7) , and within vEEG dressing.  - s/p permethrin wash and shaving   - decontaminated multiple times, last on 3/7 and no bugs seen last few days, as per Epidemiology can now take off isolation precautions
 came to [facility] and identified bugs as "bird mites". He was decontaminated at [facility]. Bugs seen on patient and sheets at [facility] ICU (3/7) , and within vEEG dressing.  - s/p permethrin wash and shaving   - decontaminated multiple times, last on 3/7 and no bugs seen last few days, as per Epidemiology can now take off isolation precautions
 came to [facility] and identified bugs as "bird mites". He was decontaminated at [facility]. Bugs seen on patient and sheets at [facility] ICU (3/7) , and within vEEG dressing.  - s/p permethrin wash and shaving   - decontaminated multiple times, last on 3/7 and no bugs seen last few days, as per Epidemiology can now take off isolation precautions
 came to [facility] and identified bugs as "bird mites". He was decontaminated at [facility]. Bugs seen on patient and sheets at [facility] ICU (3/7) , and within vEEG dressing.  - s/p permethrin wash and shaving   - decontaminated multiple times, last on 3/7 and no bugs seen last few days, as per Epidemiology can now take off isolation precautions

## 2023-03-14 NOTE — PROGRESS NOTE ADULT - PROBLEM SELECTOR PLAN 4
Left bundle of unknown chronicity. Upsloping T waves in V2/V3. Trop I wnl.  No clinical signs of HF, BNP 3100. Likely w/ some component of demand ischemia.  - Troponins plateaued   - TTE not able to be performed as patient was agitated, and is currently declining it

## 2023-03-14 NOTE — PROGRESS NOTE ADULT - PROBLEM SELECTOR PLAN 5
Cr 2.03 on admission (3/6). Likely pre-renal MEERA.. Unknown baseline Cr.   I/s/o severe sepsis, Cr improving w/ treatment of sepsis.  - Cr continuing to downtrend  - continue to trend creatinine
Cr 2.03 on admission (3/6). Likely pre-renal MEERA. Unknown baseline Cr.   I/s/o severe sepsis, Cr improving w/ treatment of sepsis.  - Cr continuing to downtrend  - Pt refusing labs.
Cr 2.03 on admission (3/6). Likely pre-renal MEERA. Unknown baseline Cr.   I/s/o severe sepsis, Cr improving w/ treatment of sepsis.  - Cr continuing to downtrend  - Pt refusing labs.
Cr 2.03 on admission (3/6). Likely pre-renal MEERA.. Unknown baseline Cr.   I/s/o severe sepsis, Cr improving w/ treatment of sepsis.  - Cr continuing to downtrend  - continue to trend creatinine

## 2023-03-14 NOTE — OCCUPATIONAL THERAPY INITIAL EVALUATION ADULT - GENERAL OBSERVATIONS, REHAB EVAL
OT IE Completed. Pt's RN Wayne cleared pt for therapy. Pt received seated in bedside chair, +heplock, NAD, requiring max encouragement for participation in OT. PT Allis present. Pt tolerated session fairly, however, agitated throughout due to waiting for his breakfast. Pt left seated in bedside chair.

## 2023-03-14 NOTE — PROGRESS NOTE ADULT - PROBLEM SELECTOR PROBLEM 5
MEERA (acute kidney injury)

## 2023-03-14 NOTE — DISCHARGE NOTE NURSING/CASE MANAGEMENT/SOCIAL WORK - PATIENT PORTAL LINK FT
You can access the FollowMyHealth Patient Portal offered by [facility] by registering at the following website: [email or web address]. By joining [facility]’s FollowMyHealth portal, you will also be able to view your health information using other applications (apps) compatible with our system.

## 2023-03-14 NOTE — PROGRESS NOTE ADULT - ATTENDING COMMENTS
Pt seen and examined be me at bedside. Hospital course reviewed and case discussed with resident who accepted the transfer. pending PT recs and SW consult for disp.
Continue current care with Haldol and Depakote for agitation. Behavioral health following. Hb stable.
53-year-old male with unknown PMHx who presented with hemorrhagic shock 2/2 bird mites, initially admitted to MICU, now stepped down to [facility].      #Aspiration Pneumonia    -s/p Zosyn, continue with Cefpodoxime 200mg BID (end date: 3/16)      #Hemorrhagic Shock (resolved)  #Bird Mites  -hemoglobin stable, s/p permethrin wash and shaving     #Agitation    -psych consulted, appreciate recommendations, psychiatrically cleared for discharge    -continue with Depakote BID, Haldol TID and Trazadone qhs    -discharge with Depakote and outpatient psych follow up      #Seizure   -CT Head and UTox unremarkable    -vEEG without signs of seizure    DVT PPx: SQH    Dispo: shelter today
53-year-old male with unknown PMHx who presented with hemorrhagic shock 2/2 bird mites, initially admitted to MICU, now stepped down to [facility].       #Aspiration Pneumonia     -s/p Zosyn, transition to Augmentin upon discharge (end date: 3/16)     #Hemorrhagic Shock (resolved)   #Bird Mites   -hemoglobin stable, s/p permethrin wash and shaving      #Agitation     -psych consulted, appreciate recommendations, psychiatrically cleared for discharge     -continue with Depakote BID, Haldol TID and Trazadone qhs     -discharge with Depakote and outpatient psych follow up       #Seizure    -CT Head and UTox unremarkable     -vEEG without signs of seizure    DVT PPx: SQH    Dispo: shelter
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 Now clinically stable and medically cleared for discharge pending shelter placement.

## 2023-03-14 NOTE — PROGRESS NOTE ADULT - PROBLEM SELECTOR PLAN 1
On admission hypothermic with leukocytosis, on ursula hugger and required right femoral warming catheter. Hypothermia likely 2/2 environment and infection. CT A/P w/ complete consolidation of visualized RLL, likely source of sepsis. MRSA negative, MSSA positive. Was on Zosyn but as patient was immediately removing IVs after they were placed, decision was made to change abx to PO.   - Cefpodoxime 200mg BID x 10 days total- end on 3/16. Transition to augmentin on D/c.

## 2023-03-14 NOTE — PROGRESS NOTE ADULT - PROBLEM SELECTOR PLAN 3
No known past seizure hx but with witnessed seizure at [facility] w/ left eye deviation during seizure. S/p Keppra loading dose. CT head wnl. Utox neg.   - vEEG reports 3/8: Moderate generalized slowing suggestive of a moderate degree of diffuse or multifocal dysfunction. Some improvement in background slowing from previous day.   - ativan 2mg prn for seizure activity >2min
No known past seizure hx but with witnessed seizure at [facility] w/ left eye deviation during seizure. S/p Keppra loading dose. CT head wnl. Utox neg.   - vEEG reports 3/8: Moderate generalized slowing suggestive of a moderate degree of diffuse or multifocal dysfunction. Some improvement in background slowing from previous day.   - ativan 2mg prn for seizure activity >2min
No known past seizure hx but with witnessed seizure at [facility] w/ left eye deviation during seizure. S/p Keppra loading dose. CT head wnl. Utox neg.   - vEEG reports 3/8: Moderate generalized slowing suggestive of a moderate degree of diffuse or multifocal dysfunction. Some improvement in background slowing from previous day.   - ativan 2mg prn for seizure activity >2min
No known past seizure hx but with witnessed seizure at [facility] w/ left eye deviation during seizure. S/p Keppra loading dose. CT head wnl. Utox neg.   - vEEG reports 3/8: Moderate generalized slowing suggestive of a moderate degree of diffuse or multifocal dysfunction. Some improvement in background slowing from previous day.   - ativan 2mg prn for seizure activity >2min

## 2023-03-14 NOTE — PROGRESS NOTE ADULT - ASSESSMENT
52 y/o undomiciled M with unknown PMHx who presented from [facility] after being found down, admitted to MICU for hemorrhagic shock 2/2 bird mites and metabolic acidosis, requiring intubation for airway protection. Now extubated and stepped down to [facility] now medically ready for d/c pending shelter packet placement, psychiatrically cleared for d/c.         - Medically ready for D/c. Shelter packet    - no indication for PT as patient was seen by nurses climbing on bed and bed surfing and also getting out of bed to defecate on the floor in front of the medical team and walking around his room without assistance. Also seen by intern repeatedly walkinga round room unassisted and standing for several minutes unsupported in doorway and hallway.   - Pt requests a cane bc he was "pressing himself" during those episodes.   - psychiatrically no barriers to d/c

## 2023-03-14 NOTE — OCCUPATIONAL THERAPY INITIAL EVALUATION ADULT - DIAGNOSIS, OT EVAL
Pt presents with decreased balance and generalized weakness impacting his ability to independently complete ADLs, functional transfers, and functional mobility.

## 2023-03-14 NOTE — OCCUPATIONAL THERAPY INITIAL EVALUATION ADULT - MD ORDER
54 y/o undomiciled M with unknown PMHx who presented from [facility] after being found down, admitted to MICU for hemorrhagic shock 2/2 bird mites and metabolic acidosis, requiring intubation for airway protection. Now extubated and stepped down to [facility] now medically ready for d/c pending shelter packet placement, psychiatrically cleared for d/c.

## 2023-03-14 NOTE — PROGRESS NOTE ADULT - PROBLEM SELECTOR PROBLEM 7
Right AC saline lock discontinued with canula intact; tolerated well.  L TR Band removed; pressure dressing placed; dressing remains c/d/i and wnl at time of discharge.  Pt able to drink fluids and eat while in CVRU without issue; remains AAOx3 at time of discharge.   Pt ambulated to and from bathroom without issue; site remained c/d/i after ambulation.   Pt admitted that he did not have someone to drive him home and that he fully intended to drive himself home. After lengthy safety discussion, MD and pt agreed that AMA form would be signed and pt would leave by himself.   Discharger instructions, home medication list, and post discharge follow up appointments discussed with pt.   Discharged front entrance, via wheelchair, in no apparent distress. All personal belongings taken with pt. Encouraged continued compliance with MD directives.   
Infestation by mites

## 2023-03-14 NOTE — PROGRESS NOTE ADULT - PROBLEM SELECTOR PLAN 8
F: none  E: replenish as appropriate  N: Regular  VTE Prophylaxis: Heparin 5000 TID  GI: none  C: Full Code  D: 7 Uris

## 2023-03-17 DIAGNOSIS — E87.4 MIXED DISORDER OF ACID-BASE BALANCE: ICD-10-CM

## 2023-03-17 DIAGNOSIS — L89.314 PRESSURE ULCER OF RIGHT BUTTOCK, STAGE 4: ICD-10-CM

## 2023-03-17 DIAGNOSIS — J69.0 PNEUMONITIS DUE TO INHALATION OF FOOD AND VOMIT: ICD-10-CM

## 2023-03-17 DIAGNOSIS — R45.1 RESTLESSNESS AND AGITATION: ICD-10-CM

## 2023-03-17 DIAGNOSIS — N17.9 ACUTE KIDNEY FAILURE, UNSPECIFIED: ICD-10-CM

## 2023-03-17 DIAGNOSIS — I44.7 LEFT BUNDLE-BRANCH BLOCK, UNSPECIFIED: ICD-10-CM

## 2023-03-17 DIAGNOSIS — F29 UNSPECIFIED PSYCHOSIS NOT DUE TO A SUBSTANCE OR KNOWN PHYSIOLOGICAL CONDITION: ICD-10-CM

## 2023-03-17 DIAGNOSIS — Z59.01 SHELTERED HOMELESSNESS: ICD-10-CM

## 2023-03-17 DIAGNOSIS — D64.9 ANEMIA, UNSPECIFIED: ICD-10-CM

## 2023-03-17 DIAGNOSIS — A41.9 SEPSIS, UNSPECIFIED ORGANISM: ICD-10-CM

## 2023-03-17 DIAGNOSIS — R65.21 SEVERE SEPSIS WITH SEPTIC SHOCK: ICD-10-CM

## 2023-03-17 DIAGNOSIS — L89.152 PRESSURE ULCER OF SACRAL REGION, STAGE 2: ICD-10-CM

## 2023-03-17 DIAGNOSIS — B88.9 INFESTATION, UNSPECIFIED: ICD-10-CM

## 2023-03-17 DIAGNOSIS — G93.41 METABOLIC ENCEPHALOPATHY: ICD-10-CM

## 2023-03-17 DIAGNOSIS — E87.20 ACIDOSIS, UNSPECIFIED: ICD-10-CM

## 2023-03-17 DIAGNOSIS — G40.909 EPILEPSY, UNSPECIFIED, NOT INTRACTABLE, WITHOUT STATUS EPILEPTICUS: ICD-10-CM

## 2023-03-17 DIAGNOSIS — R41.82 ALTERED MENTAL STATUS, UNSPECIFIED: ICD-10-CM

## 2023-03-17 DIAGNOSIS — R57.1 HYPOVOLEMIC SHOCK: ICD-10-CM

## 2023-03-17 DIAGNOSIS — R18.8 OTHER ASCITES: ICD-10-CM

## 2023-03-17 SDOH — ECONOMIC STABILITY - HOUSING INSECURITY: SHELTERED HOMELESSNESS: Z59.01

## 2023-04-18 PROBLEM — Z00.00 ENCOUNTER FOR PREVENTIVE HEALTH EXAMINATION: Status: ACTIVE | Noted: 2023-04-18

## 2024-03-27 NOTE — BH CONSULTATION LIAISON PROGRESS NOTE - NSBHMSEBODY_PSY_A_CORE
From: Mandie Dietz  To: Dr. Camelia Hughes  Sent: 3/26/2024 8:03 PM EDT  Subject: Refill please. Cant find on refil list    insulin detemir 100 UNIT/ML injection pen  Commonly known as: LEVEMIR  I take 30 am and 30 pm   
Average build
Average build
Malnourished